# Patient Record
Sex: FEMALE | Race: WHITE | NOT HISPANIC OR LATINO | Employment: OTHER | URBAN - METROPOLITAN AREA
[De-identification: names, ages, dates, MRNs, and addresses within clinical notes are randomized per-mention and may not be internally consistent; named-entity substitution may affect disease eponyms.]

---

## 2017-12-05 ENCOUNTER — APPOINTMENT (OUTPATIENT)
Dept: PREADMISSION TESTING | Facility: HOSPITAL | Age: 62
End: 2017-12-05

## 2017-12-05 ENCOUNTER — HOSPITAL ENCOUNTER (OUTPATIENT)
Dept: GENERAL RADIOLOGY | Facility: HOSPITAL | Age: 62
Discharge: HOME | End: 2017-12-05
Admitting: ORTHOPAEDIC SURGERY

## 2017-12-05 VITALS — WEIGHT: 233.03 LBS | HEIGHT: 67 IN | BODY MASS INDEX: 36.57 KG/M2

## 2017-12-05 LAB
ABO GROUP BLD: NORMAL
ALBUMIN SERPL-MCNC: 4.2 G/DL (ref 3.2–4.8)
ALBUMIN/GLOB SERPL: 1.6 G/DL (ref 1.5–2.5)
ALP SERPL-CCNC: 77 U/L (ref 25–100)
ALT SERPL W P-5'-P-CCNC: 15 U/L (ref 7–40)
ANION GAP SERPL CALCULATED.3IONS-SCNC: 6 MMOL/L (ref 3–11)
APTT PPP: 29.9 SECONDS (ref 24–31)
AST SERPL-CCNC: 26 U/L (ref 0–33)
BACTERIA UR QL AUTO: ABNORMAL /HPF
BASOPHILS # BLD AUTO: 0.02 10*3/MM3 (ref 0–0.2)
BASOPHILS NFR BLD AUTO: 0.4 % (ref 0–1)
BILIRUB SERPL-MCNC: 0.3 MG/DL (ref 0.3–1.2)
BILIRUB UR QL STRIP: NEGATIVE
BLD GP AB SCN SERPL QL: NEGATIVE
BUN BLD-MCNC: 17 MG/DL (ref 9–23)
BUN/CREAT SERPL: 15.5 (ref 7–25)
CALCIUM SPEC-SCNC: 9.7 MG/DL (ref 8.7–10.4)
CHLORIDE SERPL-SCNC: 106 MMOL/L (ref 99–109)
CLARITY UR: ABNORMAL
CO2 SERPL-SCNC: 30 MMOL/L (ref 20–31)
COLOR UR: YELLOW
CREAT BLD-MCNC: 1.1 MG/DL (ref 0.6–1.3)
CRP SERPL-MCNC: 0.93 MG/DL (ref 0–1)
DEPRECATED RDW RBC AUTO: 48.8 FL (ref 37–54)
EOSINOPHIL # BLD AUTO: 0.15 10*3/MM3 (ref 0–0.3)
EOSINOPHIL NFR BLD AUTO: 2.6 % (ref 0–3)
ERYTHROCYTE [DISTWIDTH] IN BLOOD BY AUTOMATED COUNT: 14.1 % (ref 11.3–14.5)
ERYTHROCYTE [SEDIMENTATION RATE] IN BLOOD: 15 MM/HR (ref 0–30)
GFR SERPL CREATININE-BSD FRML MDRD: 50 ML/MIN/1.73
GLOBULIN UR ELPH-MCNC: 2.7 GM/DL
GLUCOSE BLD-MCNC: 79 MG/DL (ref 70–100)
GLUCOSE UR STRIP-MCNC: NEGATIVE MG/DL
HCT VFR BLD AUTO: 40.4 % (ref 34.5–44)
HGB BLD-MCNC: 12.9 G/DL (ref 11.5–15.5)
HGB UR QL STRIP.AUTO: NEGATIVE
HYALINE CASTS UR QL AUTO: ABNORMAL /LPF
IMM GRANULOCYTES # BLD: 0.01 10*3/MM3 (ref 0–0.03)
IMM GRANULOCYTES NFR BLD: 0.2 % (ref 0–0.6)
INR PPP: 0.98
KETONES UR QL STRIP: NEGATIVE
LEUKOCYTE ESTERASE UR QL STRIP.AUTO: NEGATIVE
LYMPHOCYTES # BLD AUTO: 1.53 10*3/MM3 (ref 0.6–4.8)
LYMPHOCYTES NFR BLD AUTO: 26.9 % (ref 24–44)
MCH RBC QN AUTO: 29.9 PG (ref 27–31)
MCHC RBC AUTO-ENTMCNC: 31.9 G/DL (ref 32–36)
MCV RBC AUTO: 93.7 FL (ref 80–99)
MONOCYTES # BLD AUTO: 0.37 10*3/MM3 (ref 0–1)
MONOCYTES NFR BLD AUTO: 6.5 % (ref 0–12)
NEUTROPHILS # BLD AUTO: 3.61 10*3/MM3 (ref 1.5–8.3)
NEUTROPHILS NFR BLD AUTO: 63.4 % (ref 41–71)
NITRITE UR QL STRIP: NEGATIVE
PH UR STRIP.AUTO: 8 [PH] (ref 5–8)
PLATELET # BLD AUTO: 322 10*3/MM3 (ref 150–450)
PMV BLD AUTO: 9.3 FL (ref 6–12)
POTASSIUM BLD-SCNC: 4 MMOL/L (ref 3.5–5.5)
PROT SERPL-MCNC: 6.9 G/DL (ref 5.7–8.2)
PROT UR QL STRIP: NEGATIVE
PROTHROMBIN TIME: 10.7 SECONDS (ref 9.6–11.5)
RBC # BLD AUTO: 4.31 10*6/MM3 (ref 3.89–5.14)
RBC # UR: ABNORMAL /HPF
REF LAB TEST METHOD: ABNORMAL
RH BLD: NEGATIVE
SODIUM BLD-SCNC: 142 MMOL/L (ref 132–146)
SP GR UR STRIP: 1.01 (ref 1–1.03)
SQUAMOUS #/AREA URNS HPF: ABNORMAL /HPF
UROBILINOGEN UR QL STRIP: ABNORMAL
WBC NRBC COR # BLD: 5.69 10*3/MM3 (ref 3.5–10.8)
WBC UR QL AUTO: ABNORMAL /HPF

## 2017-12-05 PROCEDURE — 86900 BLOOD TYPING SEROLOGIC ABO: CPT | Performed by: ORTHOPAEDIC SURGERY

## 2017-12-05 PROCEDURE — 85610 PROTHROMBIN TIME: CPT | Performed by: ORTHOPAEDIC SURGERY

## 2017-12-05 PROCEDURE — 86140 C-REACTIVE PROTEIN: CPT | Performed by: ORTHOPAEDIC SURGERY

## 2017-12-05 PROCEDURE — 85025 COMPLETE CBC W/AUTO DIFF WBC: CPT | Performed by: ORTHOPAEDIC SURGERY

## 2017-12-05 PROCEDURE — 85730 THROMBOPLASTIN TIME PARTIAL: CPT | Performed by: ORTHOPAEDIC SURGERY

## 2017-12-05 PROCEDURE — 85652 RBC SED RATE AUTOMATED: CPT | Performed by: ORTHOPAEDIC SURGERY

## 2017-12-05 PROCEDURE — 86850 RBC ANTIBODY SCREEN: CPT | Performed by: ORTHOPAEDIC SURGERY

## 2017-12-05 PROCEDURE — 93010 ELECTROCARDIOGRAM REPORT: CPT | Performed by: INTERNAL MEDICINE

## 2017-12-05 PROCEDURE — 93005 ELECTROCARDIOGRAM TRACING: CPT

## 2017-12-05 PROCEDURE — 86901 BLOOD TYPING SEROLOGIC RH(D): CPT | Performed by: ORTHOPAEDIC SURGERY

## 2017-12-05 PROCEDURE — 81001 URINALYSIS AUTO W/SCOPE: CPT | Performed by: ORTHOPAEDIC SURGERY

## 2017-12-05 PROCEDURE — 80053 COMPREHEN METABOLIC PANEL: CPT | Performed by: ORTHOPAEDIC SURGERY

## 2017-12-05 PROCEDURE — 71020 HC CHEST PA AND LATERAL: CPT

## 2017-12-05 PROCEDURE — 36415 COLL VENOUS BLD VENIPUNCTURE: CPT

## 2017-12-05 RX ORDER — ALBUTEROL SULFATE 90 UG/1
2 AEROSOL, METERED RESPIRATORY (INHALATION) EVERY 4 HOURS PRN
COMMUNITY

## 2017-12-05 RX ORDER — ASPIRIN 81 MG/1
81 TABLET ORAL DAILY
Status: ON HOLD | COMMUNITY
End: 2017-12-13

## 2017-12-05 RX ORDER — LEVOTHYROXINE SODIUM 0.1 MG/1
100 TABLET ORAL DAILY
COMMUNITY

## 2017-12-05 RX ORDER — PHENOL 1.4 %
20 AEROSOL, SPRAY (ML) MUCOUS MEMBRANE NIGHTLY
COMMUNITY

## 2017-12-05 RX ORDER — HYDROXYZINE PAMOATE 25 MG/1
25-75 CAPSULE ORAL DAILY PRN
COMMUNITY

## 2017-12-05 RX ORDER — CHLORAL HYDRATE 500 MG
1000 CAPSULE ORAL
COMMUNITY

## 2017-12-05 RX ORDER — ESCITALOPRAM OXALATE 20 MG/1
20 TABLET ORAL DAILY
COMMUNITY

## 2017-12-05 RX ORDER — LANOLIN ALCOHOL/MO/W.PET/CERES
1000 CREAM (GRAM) TOPICAL DAILY
COMMUNITY

## 2017-12-05 RX ORDER — IBUPROFEN 800 MG/1
800 TABLET ORAL EVERY 8 HOURS PRN
Status: ON HOLD | COMMUNITY
End: 2017-12-13

## 2017-12-05 RX ORDER — ALPRAZOLAM 0.5 MG/1
0.5 TABLET ORAL NIGHTLY
COMMUNITY

## 2017-12-05 RX ORDER — LEVOCETIRIZINE DIHYDROCHLORIDE 5 MG/1
5 TABLET, FILM COATED ORAL EVERY EVENING
COMMUNITY

## 2017-12-05 RX ORDER — TRAZODONE HYDROCHLORIDE 100 MG/1
300 TABLET ORAL NIGHTLY
COMMUNITY

## 2017-12-05 RX ORDER — MELATONIN
1000 DAILY
COMMUNITY

## 2017-12-05 ASSESSMENT — HOOS JR
HOOS JR SCORE: 49.858
HOOS JR SCORE: 13

## 2017-12-05 NOTE — DISCHARGE INSTRUCTIONS
The following information and instructions were given:    NPO after MN except sips of water with routine prescribed medication (except blood thinner, diabetes, or weight reducing medication) unless otherwise instructed by your physician.  Do not eat, drink, smoke or chew gum after MN the night before surgery. This also includes no mints.    DO NOT shave for two days before your procedure.  Do not wear makeup.      DO NOT wear fingernail polish (gel/regular) and/or acrylic/artificial nails on the day of surgery.   If a patient had recent manicure and would rather not remove polish or artificial nails, then the minimum requirement is that the polish/artificial nails must be removed from the middle finger on each hand.      If patient was having surgery on an upper extremity, then the patient was instructed that fingernail polish/artificial fingernails must be removed for surgery.  NO EXCEPTIONS.      If patient was having surgery on a lower extremity, then the patient was instructed that toenail polish on both extremities must be removed for surgery.  NO EXCEPTIONS.    Remove all jewelry (advised to go to jeweler if unable to remove).  Jewelry especially rings can no longer be taped for surgery.    Leave anything you consider valuable at home.    Leave your suitcase in the car until after your surgery.    Bring the following with you (if applicable)   -picture ID and insurance cards   -Co-pay/deductible required by insurance   -Medications in the original bottles (not a list) including all over-the-counter  medications if not brought to PAT   -Copy of advance directive, living will or power of  documents if not  brought to PAT   -CPAP or BIPAP mask and tubing (do not bring machine)   -Skin prep instructions sheet   -PAT Pass    Education booklet, brochure, handout or binder given to patient.    Pain Control After Surgery handout given to patient.    Respirex use (handout given to patient) and pneumonia  prevention.    Signs and Symptoms of infection.    DVT Prevention stressing the importance of ambulation.    Patient to apply Chlorhexadine wipes to surgical area (as instructed) the night before procedure and the AM of procedure.    When applicable for ERAS patients (colon, orthropedic), patients were instructed to drink 20 ounces of Gatorade or G2 for diabetics (or until full) the morning of surgery.  The Gatorade or G2 must be consumed at least 3 hours before surgery start time.  No RED Gatorade or G2.  Appropriated ERAS handout given to patient during PAT visit.

## 2017-12-05 NOTE — PAT
Heladio in surgery scheduling notified of pt latex allergy, surgery with dr grubbs on 12-11-17    Patient to apply Chlorhexadine wipes  to surgical area (as instructed) the night before procedure and the AM of procedure. Wipes provided.    Pt instructed to bring cpap mask and tubing on date of surgery     Brandy in dr grubbs office notified of pt adhesive tape and latex allergy, pt skin blistered and came off at dressing site with last hip surgery in , she stated she would let dr grubbs know

## 2017-12-11 ENCOUNTER — APPOINTMENT (OUTPATIENT)
Dept: GENERAL RADIOLOGY | Facility: HOSPITAL | Age: 62
End: 2017-12-11

## 2017-12-11 ENCOUNTER — ANESTHESIA EVENT (OUTPATIENT)
Dept: PERIOP | Facility: HOSPITAL | Age: 62
End: 2017-12-11

## 2017-12-11 ENCOUNTER — HOSPITAL ENCOUNTER (INPATIENT)
Facility: HOSPITAL | Age: 62
LOS: 2 days | Discharge: HOME | End: 2017-12-13
Attending: ORTHOPAEDIC SURGERY | Admitting: ORTHOPAEDIC SURGERY

## 2017-12-11 ENCOUNTER — ANESTHESIA (OUTPATIENT)
Dept: PERIOP | Facility: HOSPITAL | Age: 62
End: 2017-12-11

## 2017-12-11 DIAGNOSIS — Z74.09 IMPAIRED MOBILITY AND ADLS: ICD-10-CM

## 2017-12-11 DIAGNOSIS — Z78.9 IMPAIRED MOBILITY AND ADLS: ICD-10-CM

## 2017-12-11 DIAGNOSIS — Z74.09 IMPAIRED FUNCTIONAL MOBILITY, BALANCE, GAIT, AND ENDURANCE: Primary | ICD-10-CM

## 2017-12-11 PROBLEM — F41.9 ANXIETY AND DEPRESSION: Status: ACTIVE | Noted: 2017-12-11

## 2017-12-11 PROBLEM — G47.33 OSA ON CPAP: Status: ACTIVE | Noted: 2017-12-11

## 2017-12-11 PROBLEM — Z99.89 OSA ON CPAP: Status: ACTIVE | Noted: 2017-12-11

## 2017-12-11 PROBLEM — M16.12 ARTHRITIS OF LEFT HIP: Status: ACTIVE | Noted: 2017-12-11

## 2017-12-11 PROBLEM — F32.A ANXIETY AND DEPRESSION: Status: ACTIVE | Noted: 2017-12-11

## 2017-12-11 PROBLEM — Z96.642 STATUS POST TOTAL REPLACEMENT OF LEFT HIP: Status: ACTIVE | Noted: 2017-12-11

## 2017-12-11 PROBLEM — E03.9 HYPOTHYROID: Status: ACTIVE | Noted: 2017-12-11

## 2017-12-11 LAB
ABO GROUP BLD: NORMAL
BLD GP AB SCN SERPL QL: NEGATIVE
RH BLD: NEGATIVE

## 2017-12-11 PROCEDURE — C1776 JOINT DEVICE (IMPLANTABLE): HCPCS | Performed by: ORTHOPAEDIC SURGERY

## 2017-12-11 PROCEDURE — 25010000003 CEFAZOLIN IN DEXTROSE 2-4 GM/100ML-% SOLUTION: Performed by: ORTHOPAEDIC SURGERY

## 2017-12-11 PROCEDURE — 25010000002 PHENYLEPHRINE PER 1 ML: Performed by: NURSE ANESTHETIST, CERTIFIED REGISTERED

## 2017-12-11 PROCEDURE — 97110 THERAPEUTIC EXERCISES: CPT

## 2017-12-11 PROCEDURE — 25010000002 PROPOFOL 1000 MG/ML EMULSION: Performed by: NURSE ANESTHETIST, CERTIFIED REGISTERED

## 2017-12-11 PROCEDURE — 97162 PT EVAL MOD COMPLEX 30 MIN: CPT

## 2017-12-11 PROCEDURE — 0SRB04A REPLACEMENT OF LEFT HIP JOINT WITH CERAMIC ON POLYETHYLENE SYNTHETIC SUBSTITUTE, UNCEMENTED, OPEN APPROACH: ICD-10-PCS | Performed by: ORTHOPAEDIC SURGERY

## 2017-12-11 PROCEDURE — 25010000002 ROPIVACAINE PER 1 MG: Performed by: ORTHOPAEDIC SURGERY

## 2017-12-11 PROCEDURE — 86850 RBC ANTIBODY SCREEN: CPT | Performed by: ORTHOPAEDIC SURGERY

## 2017-12-11 PROCEDURE — 25010000002 ONDANSETRON PER 1 MG: Performed by: NURSE PRACTITIONER

## 2017-12-11 PROCEDURE — C1755 CATHETER, INTRASPINAL: HCPCS | Performed by: ORTHOPAEDIC SURGERY

## 2017-12-11 PROCEDURE — 73502 X-RAY EXAM HIP UNI 2-3 VIEWS: CPT

## 2017-12-11 PROCEDURE — 25010000002 HYDROMORPHONE PER 4 MG: Performed by: INTERNAL MEDICINE

## 2017-12-11 PROCEDURE — 86900 BLOOD TYPING SEROLOGIC ABO: CPT | Performed by: ORTHOPAEDIC SURGERY

## 2017-12-11 PROCEDURE — 25010000002 FENTANYL CITRATE (PF) 100 MCG/2ML SOLUTION: Performed by: NURSE ANESTHETIST, CERTIFIED REGISTERED

## 2017-12-11 PROCEDURE — 25010000002 MIDAZOLAM PER 1 MG: Performed by: NURSE ANESTHETIST, CERTIFIED REGISTERED

## 2017-12-11 PROCEDURE — 76000 FLUOROSCOPY <1 HR PHYS/QHP: CPT

## 2017-12-11 PROCEDURE — 86901 BLOOD TYPING SEROLOGIC RH(D): CPT | Performed by: ORTHOPAEDIC SURGERY

## 2017-12-11 DEVICE — PINNACLE GRIPTION ACETABULAR SHELL SECTOR 56MM OD
Type: IMPLANTABLE DEVICE | Site: HIP | Status: FUNCTIONAL
Brand: PINNACLE GRIPTION

## 2017-12-11 DEVICE — TOTL HIP STEM DEPUY UPCHRG: Type: IMPLANTABLE DEVICE | Site: HIP | Status: FUNCTIONAL

## 2017-12-11 DEVICE — TOTL HIP GRIPTION CUP DEPUY UPCHRG: Type: IMPLANTABLE DEVICE | Site: HIP | Status: FUNCTIONAL

## 2017-12-11 DEVICE — PINNACLE HIP SOLUTIONS ALTRX POLYETHYLENE ACETABULAR LINER NEUTRAL 36MM ID 56MM OD
Type: IMPLANTABLE DEVICE | Site: HIP | Status: FUNCTIONAL
Brand: PINNACLE ALTRX

## 2017-12-11 DEVICE — TOTL HIP MOA DEPUY 9641333: Type: IMPLANTABLE DEVICE | Site: HIP | Status: FUNCTIONAL

## 2017-12-11 DEVICE — CORAIL HIP SYSTEM CEMENTLESS FEMORAL STEM 12/14 AMT 135 DEGREES KA SIZE 11 HA COATED STANDARD COLLAR
Type: IMPLANTABLE DEVICE | Site: HIP | Status: FUNCTIONAL
Brand: CORAIL

## 2017-12-11 DEVICE — M-SPEC METAL FEMORAL HEAD 12/14 TAPER DIAMETER 36MM +1.5: Type: IMPLANTABLE DEVICE | Site: HIP | Status: FUNCTIONAL

## 2017-12-11 RX ORDER — FENTANYL CITRATE 50 UG/ML
50 INJECTION, SOLUTION INTRAMUSCULAR; INTRAVENOUS
Status: DISCONTINUED | OUTPATIENT
Start: 2017-12-11 | End: 2017-12-11 | Stop reason: HOSPADM

## 2017-12-11 RX ORDER — FENTANYL CITRATE 50 UG/ML
INJECTION, SOLUTION INTRAMUSCULAR; INTRAVENOUS AS NEEDED
Status: DISCONTINUED | OUTPATIENT
Start: 2017-12-11 | End: 2017-12-11 | Stop reason: SURG

## 2017-12-11 RX ORDER — MAGNESIUM HYDROXIDE 1200 MG/15ML
LIQUID ORAL AS NEEDED
Status: DISCONTINUED | OUTPATIENT
Start: 2017-12-11 | End: 2017-12-11 | Stop reason: HOSPADM

## 2017-12-11 RX ORDER — LABETALOL HYDROCHLORIDE 5 MG/ML
5 INJECTION, SOLUTION INTRAVENOUS
Status: DISCONTINUED | OUTPATIENT
Start: 2017-12-11 | End: 2017-12-11 | Stop reason: HOSPADM

## 2017-12-11 RX ORDER — HYDRALAZINE HYDROCHLORIDE 20 MG/ML
10 INJECTION INTRAMUSCULAR; INTRAVENOUS EVERY 6 HOURS PRN
Status: DISCONTINUED | OUTPATIENT
Start: 2017-12-11 | End: 2017-12-13 | Stop reason: HOSPADM

## 2017-12-11 RX ORDER — DOCUSATE SODIUM 100 MG/1
100 CAPSULE, LIQUID FILLED ORAL 2 TIMES DAILY
Status: DISCONTINUED | OUTPATIENT
Start: 2017-12-11 | End: 2017-12-13 | Stop reason: HOSPADM

## 2017-12-11 RX ORDER — CEFAZOLIN SODIUM 2 G/100ML
2 INJECTION, SOLUTION INTRAVENOUS ONCE
Status: COMPLETED | OUTPATIENT
Start: 2017-12-11 | End: 2017-12-11

## 2017-12-11 RX ORDER — OXYCODONE HYDROCHLORIDE AND ACETAMINOPHEN 5; 325 MG/1; MG/1
1 TABLET ORAL EVERY 4 HOURS PRN
Status: DISCONTINUED | OUTPATIENT
Start: 2017-12-11 | End: 2017-12-13 | Stop reason: HOSPADM

## 2017-12-11 RX ORDER — SENNA AND DOCUSATE SODIUM 50; 8.6 MG/1; MG/1
2 TABLET, FILM COATED ORAL 2 TIMES DAILY
Status: DISCONTINUED | OUTPATIENT
Start: 2017-12-11 | End: 2017-12-13 | Stop reason: HOSPADM

## 2017-12-11 RX ORDER — EPHEDRINE SULFATE 50 MG/ML
5 INJECTION, SOLUTION INTRAVENOUS ONCE AS NEEDED
Status: DISCONTINUED | OUTPATIENT
Start: 2017-12-11 | End: 2017-12-11 | Stop reason: HOSPADM

## 2017-12-11 RX ORDER — BISACODYL 10 MG
10 SUPPOSITORY, RECTAL RECTAL DAILY PRN
Status: DISCONTINUED | OUTPATIENT
Start: 2017-12-11 | End: 2017-12-13 | Stop reason: HOSPADM

## 2017-12-11 RX ORDER — HYDROMORPHONE HYDROCHLORIDE 1 MG/ML
0.5 INJECTION, SOLUTION INTRAMUSCULAR; INTRAVENOUS; SUBCUTANEOUS
Status: DISCONTINUED | OUTPATIENT
Start: 2017-12-11 | End: 2017-12-13 | Stop reason: HOSPADM

## 2017-12-11 RX ORDER — ALPRAZOLAM 0.5 MG/1
0.5 TABLET ORAL NIGHTLY PRN
Status: DISCONTINUED | OUTPATIENT
Start: 2017-12-11 | End: 2017-12-13 | Stop reason: HOSPADM

## 2017-12-11 RX ORDER — ONDANSETRON 2 MG/ML
4 INJECTION INTRAMUSCULAR; INTRAVENOUS ONCE AS NEEDED
Status: DISCONTINUED | OUTPATIENT
Start: 2017-12-11 | End: 2017-12-11 | Stop reason: HOSPADM

## 2017-12-11 RX ORDER — BISACODYL 5 MG/1
10 TABLET, DELAYED RELEASE ORAL DAILY PRN
Status: DISCONTINUED | OUTPATIENT
Start: 2017-12-11 | End: 2017-12-13 | Stop reason: HOSPADM

## 2017-12-11 RX ORDER — DIPHENHYDRAMINE HCL 25 MG
25 CAPSULE ORAL EVERY 6 HOURS PRN
Status: DISCONTINUED | OUTPATIENT
Start: 2017-12-11 | End: 2017-12-13 | Stop reason: HOSPADM

## 2017-12-11 RX ORDER — TRAMADOL HYDROCHLORIDE 50 MG/1
50 TABLET ORAL EVERY 6 HOURS PRN
Status: DISCONTINUED | OUTPATIENT
Start: 2017-12-11 | End: 2017-12-13 | Stop reason: HOSPADM

## 2017-12-11 RX ORDER — LIDOCAINE HYDROCHLORIDE 10 MG/ML
INJECTION, SOLUTION INFILTRATION; PERINEURAL AS NEEDED
Status: DISCONTINUED | OUTPATIENT
Start: 2017-12-11 | End: 2017-12-11 | Stop reason: SURG

## 2017-12-11 RX ORDER — SODIUM CHLORIDE 0.9 % (FLUSH) 0.9 %
1-10 SYRINGE (ML) INJECTION AS NEEDED
Status: DISCONTINUED | OUTPATIENT
Start: 2017-12-11 | End: 2017-12-11 | Stop reason: HOSPADM

## 2017-12-11 RX ORDER — BUPIVACAINE HYDROCHLORIDE 5 MG/ML
INJECTION, SOLUTION EPIDURAL; INTRACAUDAL AS NEEDED
Status: DISCONTINUED | OUTPATIENT
Start: 2017-12-11 | End: 2017-12-11 | Stop reason: SURG

## 2017-12-11 RX ORDER — ESCITALOPRAM OXALATE 20 MG/1
20 TABLET ORAL DAILY
Status: DISCONTINUED | OUTPATIENT
Start: 2017-12-11 | End: 2017-12-13 | Stop reason: HOSPADM

## 2017-12-11 RX ORDER — ONDANSETRON 2 MG/ML
4 INJECTION INTRAMUSCULAR; INTRAVENOUS EVERY 6 HOURS PRN
Status: DISCONTINUED | OUTPATIENT
Start: 2017-12-11 | End: 2017-12-13 | Stop reason: HOSPADM

## 2017-12-11 RX ORDER — SODIUM CHLORIDE, SODIUM LACTATE, POTASSIUM CHLORIDE, CALCIUM CHLORIDE 600; 310; 30; 20 MG/100ML; MG/100ML; MG/100ML; MG/100ML
9 INJECTION, SOLUTION INTRAVENOUS CONTINUOUS
Status: DISCONTINUED | OUTPATIENT
Start: 2017-12-11 | End: 2017-12-11

## 2017-12-11 RX ORDER — SODIUM CHLORIDE 0.9 % (FLUSH) 0.9 %
1-10 SYRINGE (ML) INJECTION AS NEEDED
Status: DISCONTINUED | OUTPATIENT
Start: 2017-12-11 | End: 2017-12-11

## 2017-12-11 RX ORDER — OXYCODONE HYDROCHLORIDE AND ACETAMINOPHEN 5; 325 MG/1; MG/1
1 TABLET ORAL ONCE AS NEEDED
Status: DISCONTINUED | OUTPATIENT
Start: 2017-12-11 | End: 2017-12-11 | Stop reason: HOSPADM

## 2017-12-11 RX ORDER — HYDROCODONE BITARTRATE AND ACETAMINOPHEN 5; 325 MG/1; MG/1
1 TABLET ORAL EVERY 4 HOURS PRN
Status: DISCONTINUED | OUTPATIENT
Start: 2017-12-11 | End: 2017-12-11

## 2017-12-11 RX ORDER — MIDAZOLAM HYDROCHLORIDE 1 MG/ML
INJECTION INTRAMUSCULAR; INTRAVENOUS AS NEEDED
Status: DISCONTINUED | OUTPATIENT
Start: 2017-12-11 | End: 2017-12-11 | Stop reason: SURG

## 2017-12-11 RX ORDER — SODIUM CHLORIDE 9 MG/ML
150 INJECTION, SOLUTION INTRAVENOUS CONTINUOUS
Status: DISCONTINUED | OUTPATIENT
Start: 2017-12-11 | End: 2017-12-13 | Stop reason: HOSPADM

## 2017-12-11 RX ORDER — ROPIVACAINE HYDROCHLORIDE 5 MG/ML
INJECTION, SOLUTION EPIDURAL; INFILTRATION; PERINEURAL AS NEEDED
Status: DISCONTINUED | OUTPATIENT
Start: 2017-12-11 | End: 2017-12-11 | Stop reason: HOSPADM

## 2017-12-11 RX ORDER — FAMOTIDINE 20 MG/1
20 TABLET, FILM COATED ORAL ONCE
Status: COMPLETED | OUTPATIENT
Start: 2017-12-11 | End: 2017-12-11

## 2017-12-11 RX ORDER — LIDOCAINE HYDROCHLORIDE 10 MG/ML
0.5 INJECTION, SOLUTION EPIDURAL; INFILTRATION; INTRACAUDAL; PERINEURAL ONCE AS NEEDED
Status: COMPLETED | OUTPATIENT
Start: 2017-12-11 | End: 2017-12-11

## 2017-12-11 RX ORDER — L.ACID,PARA/B.BIFIDUM/S.THERM 8B CELL
1 CAPSULE ORAL DAILY
Status: DISCONTINUED | OUTPATIENT
Start: 2017-12-11 | End: 2017-12-13 | Stop reason: HOSPADM

## 2017-12-11 RX ORDER — HYDROMORPHONE HYDROCHLORIDE 1 MG/ML
0.5 INJECTION, SOLUTION INTRAMUSCULAR; INTRAVENOUS; SUBCUTANEOUS
Status: DISCONTINUED | OUTPATIENT
Start: 2017-12-11 | End: 2017-12-11 | Stop reason: HOSPADM

## 2017-12-11 RX ORDER — PROCHLORPERAZINE 25 MG
25 SUPPOSITORY, RECTAL RECTAL EVERY 12 HOURS PRN
Status: DISCONTINUED | OUTPATIENT
Start: 2017-12-11 | End: 2017-12-13 | Stop reason: HOSPADM

## 2017-12-11 RX ORDER — NALOXONE HCL 0.4 MG/ML
0.4 VIAL (ML) INJECTION AS NEEDED
Status: DISCONTINUED | OUTPATIENT
Start: 2017-12-11 | End: 2017-12-11 | Stop reason: HOSPADM

## 2017-12-11 RX ORDER — TRAZODONE HYDROCHLORIDE 100 MG/1
300 TABLET ORAL NIGHTLY
Status: DISCONTINUED | OUTPATIENT
Start: 2017-12-11 | End: 2017-12-13 | Stop reason: HOSPADM

## 2017-12-11 RX ORDER — PROCHLORPERAZINE MALEATE 5 MG/1
5 TABLET ORAL EVERY 6 HOURS PRN
Status: DISCONTINUED | OUTPATIENT
Start: 2017-12-11 | End: 2017-12-13 | Stop reason: HOSPADM

## 2017-12-11 RX ORDER — LEVOTHYROXINE SODIUM 0.1 MG/1
100 TABLET ORAL DAILY
Status: DISCONTINUED | OUTPATIENT
Start: 2017-12-12 | End: 2017-12-13 | Stop reason: HOSPADM

## 2017-12-11 RX ORDER — HYDROXYZINE HYDROCHLORIDE 25 MG/1
25 TABLET, FILM COATED ORAL 3 TIMES DAILY PRN
Status: DISCONTINUED | OUTPATIENT
Start: 2017-12-11 | End: 2017-12-13 | Stop reason: HOSPADM

## 2017-12-11 RX ORDER — SODIUM CHLORIDE, SODIUM LACTATE, POTASSIUM CHLORIDE, CALCIUM CHLORIDE 600; 310; 30; 20 MG/100ML; MG/100ML; MG/100ML; MG/100ML
100 INJECTION, SOLUTION INTRAVENOUS CONTINUOUS
Status: DISCONTINUED | OUTPATIENT
Start: 2017-12-11 | End: 2017-12-11

## 2017-12-11 RX ORDER — MEPERIDINE HYDROCHLORIDE 25 MG/ML
12.5 INJECTION INTRAMUSCULAR; INTRAVENOUS; SUBCUTANEOUS
Status: DISCONTINUED | OUTPATIENT
Start: 2017-12-11 | End: 2017-12-11 | Stop reason: HOSPADM

## 2017-12-11 RX ORDER — ACETAMINOPHEN 325 MG/1
650 TABLET ORAL EVERY 4 HOURS PRN
Status: DISCONTINUED | OUTPATIENT
Start: 2017-12-11 | End: 2017-12-13 | Stop reason: HOSPADM

## 2017-12-11 RX ORDER — ASPIRIN 325 MG
325 TABLET, DELAYED RELEASE (ENTERIC COATED) ORAL DAILY
Status: DISCONTINUED | OUTPATIENT
Start: 2017-12-12 | End: 2017-12-13 | Stop reason: HOSPADM

## 2017-12-11 RX ORDER — CEFAZOLIN SODIUM 2 G/100ML
2 INJECTION, SOLUTION INTRAVENOUS EVERY 8 HOURS
Status: COMPLETED | OUTPATIENT
Start: 2017-12-11 | End: 2017-12-12

## 2017-12-11 RX ORDER — FAMOTIDINE 10 MG/ML
20 INJECTION, SOLUTION INTRAVENOUS ONCE
Status: CANCELLED | OUTPATIENT
Start: 2017-12-11 | End: 2017-12-11

## 2017-12-11 RX ADMIN — DOCUSATE SODIUM 100 MG: 100 CAPSULE, LIQUID FILLED ORAL at 17:35

## 2017-12-11 RX ADMIN — ESCITALOPRAM OXALATE 20 MG: 20 TABLET ORAL at 16:11

## 2017-12-11 RX ADMIN — PHENYLEPHRINE HYDROCHLORIDE 100 MCG: 10 INJECTION INTRAVENOUS at 11:30

## 2017-12-11 RX ADMIN — TRAMADOL HYDROCHLORIDE 50 MG: 50 TABLET, COATED ORAL at 13:25

## 2017-12-11 RX ADMIN — SODIUM CHLORIDE, POTASSIUM CHLORIDE, SODIUM LACTATE AND CALCIUM CHLORIDE: 600; 310; 30; 20 INJECTION, SOLUTION INTRAVENOUS at 11:30

## 2017-12-11 RX ADMIN — PHENYLEPHRINE HYDROCHLORIDE 100 MCG: 10 INJECTION INTRAVENOUS at 11:10

## 2017-12-11 RX ADMIN — FAMOTIDINE 20 MG: 20 TABLET, FILM COATED ORAL at 08:23

## 2017-12-11 RX ADMIN — MIDAZOLAM HYDROCHLORIDE 2 MG: 1 INJECTION, SOLUTION INTRAMUSCULAR; INTRAVENOUS at 10:04

## 2017-12-11 RX ADMIN — ALPRAZOLAM 0.5 MG: 0.5 TABLET ORAL at 21:38

## 2017-12-11 RX ADMIN — Medication 2 TABLET: at 17:35

## 2017-12-11 RX ADMIN — ONDANSETRON 4 MG: 2 INJECTION INTRAMUSCULAR; INTRAVENOUS at 16:44

## 2017-12-11 RX ADMIN — CEFAZOLIN SODIUM 2 G: 2 INJECTION, SOLUTION INTRAVENOUS at 18:12

## 2017-12-11 RX ADMIN — HYDROMORPHONE HYDROCHLORIDE 0.5 MG: 2 INJECTION INTRAMUSCULAR; INTRAVENOUS; SUBCUTANEOUS at 21:35

## 2017-12-11 RX ADMIN — BUPIVACAINE HYDROCHLORIDE 2 ML: 5 INJECTION, SOLUTION EPIDURAL; INTRACAUDAL; PERINEURAL at 10:12

## 2017-12-11 RX ADMIN — PROPOFOL 75 MCG/KG/MIN: 10 INJECTION, EMULSION INTRAVENOUS at 10:14

## 2017-12-11 RX ADMIN — OXYCODONE AND ACETAMINOPHEN 1 TABLET: 5; 325 TABLET ORAL at 23:56

## 2017-12-11 RX ADMIN — SODIUM CHLORIDE, POTASSIUM CHLORIDE, SODIUM LACTATE AND CALCIUM CHLORIDE 9 ML/HR: 600; 310; 30; 20 INJECTION, SOLUTION INTRAVENOUS at 08:23

## 2017-12-11 RX ADMIN — CEFAZOLIN SODIUM 2 G: 2 INJECTION, SOLUTION INTRAVENOUS at 10:04

## 2017-12-11 RX ADMIN — ONDANSETRON 4 MG: 2 INJECTION INTRAMUSCULAR; INTRAVENOUS at 21:36

## 2017-12-11 RX ADMIN — SODIUM CHLORIDE 150 ML/HR: 9 INJECTION, SOLUTION INTRAVENOUS at 13:27

## 2017-12-11 RX ADMIN — LIDOCAINE HYDROCHLORIDE 0.5 ML: 10 INJECTION, SOLUTION EPIDURAL; INFILTRATION; INTRACAUDAL; PERINEURAL at 08:23

## 2017-12-11 RX ADMIN — TRAZODONE HYDROCHLORIDE 300 MG: 100 TABLET ORAL at 21:35

## 2017-12-11 RX ADMIN — LIDOCAINE HYDROCHLORIDE 20 MG: 10 INJECTION, SOLUTION INFILTRATION; PERINEURAL at 10:14

## 2017-12-11 RX ADMIN — PHENYLEPHRINE HYDROCHLORIDE 200 MCG: 10 INJECTION INTRAVENOUS at 11:24

## 2017-12-11 RX ADMIN — HYDROMORPHONE HYDROCHLORIDE 0.5 MG: 2 INJECTION INTRAMUSCULAR; INTRAVENOUS; SUBCUTANEOUS at 18:11

## 2017-12-11 RX ADMIN — Medication 1 CAPSULE: at 16:11

## 2017-12-11 RX ADMIN — PHENYLEPHRINE HYDROCHLORIDE 100 MCG: 10 INJECTION INTRAVENOUS at 11:02

## 2017-12-11 RX ADMIN — TRANEXAMIC ACID 1000 MG: 100 INJECTION, SOLUTION INTRAVENOUS at 11:25

## 2017-12-11 RX ADMIN — HYDROCODONE BITARTRATE AND ACETAMINOPHEN 1 TABLET: 5; 325 TABLET ORAL at 16:11

## 2017-12-11 RX ADMIN — FENTANYL CITRATE 100 MCG: 50 INJECTION, SOLUTION INTRAMUSCULAR; INTRAVENOUS at 10:04

## 2017-12-11 RX ADMIN — SODIUM CHLORIDE 150 ML/HR: 9 INJECTION, SOLUTION INTRAVENOUS at 23:56

## 2017-12-11 RX ADMIN — TRANEXAMIC ACID 1000 MG: 100 INJECTION, SOLUTION INTRAVENOUS at 10:15

## 2017-12-11 NOTE — PLAN OF CARE
Problem: Patient Care Overview (Adult)  Goal: Plan of Care Review  Outcome: Ongoing (interventions implemented as appropriate)    12/11/17 1535   Coping/Psychosocial Response Interventions   Plan Of Care Reviewed With patient   Patient Care Overview   Progress progress toward functional goals is gradual   Outcome Evaluation   Outcome Summary/Follow up Plan Patient with great motor function with ther ex but still with some numbness/tingling in LEs from spinal and therefore with knee buckling with weight bearing. Patient able to compensate with UE weight bearing and take step towards HOB. Encouraged further ambulation with nursing later when numbness/tingling fully resolves. Plan is d/c home with HHPT. Will continue to progress as able.          Problem: Inpatient Physical Therapy  Goal: Bed Mobility Goal LTG- PT  Outcome: Ongoing (interventions implemented as appropriate)    12/11/17 1535   Bed Mobility PT LTG   Bed Mobility PT LTG, Date Established 12/11/17   Bed Mobility PT LTG, Time to Achieve 3 days   Bed Mobility PT LTG, Activity Type supine to sit/sit to supine   Bed Mobility PT LTG, [place] Level conditional independence   Bed Mobility PT LTG, Date Goal Reviewed 12/11/17   Bed Mobility PT LTG, Outcome goal ongoing       Goal: Transfer Training Goal 1 LTG- PT  Outcome: Ongoing (interventions implemented as appropriate)    12/11/17 1535   Transfer Training PT LTG   Transfer Training PT LTG, Date Established 12/11/17   Transfer Training PT LTG, Time to Achieve 3 days   Transfer Training PT LTG, Activity Type sit to stand/stand to sit   Transfer Training PT LTG, [place] Level conditional independence   Transfer Training PT LTG, Assist Device walker, rolling   Transfer Training PT LTG, Date Goal Reviewed 12/11/17   Transfer Training PT LTG, Outcome goal ongoing       Goal: Gait Training Goal LTG- PT  Outcome: Ongoing (interventions implemented as appropriate)    12/11/17 1535   Gait Training PT LTG   Gait  Training Goal PT LTG, Date Established 12/11/17   Gait Training Goal PT LTG, Time to Achieve 3 days   Gait Training Goal PT LTG, [place] Level conditional independence   Gait Training Goal PT LTG, Assist Device walker, rolling   Gait Training Goal PT LTG, Distance to Achieve 500 feet   Gait Training Goal PT LTG, Date Goal Reviewed 12/11/17   Gait Training Goal PT LTG, Outcome goal ongoing       Goal: Stair Training Goal LTG- PT  Outcome: Ongoing (interventions implemented as appropriate)    12/11/17 1535   Stair Training PT LTG   Stair Training Goal PT LTG, Date Established 12/11/17   Stair Training Goal PT LTG, Time to Achieve 3 days   Stair Training Goal PT LTG, Number of Steps 1   Stair Training Goal PT LTG, [place] Level contact guard assist   Stair Training Goal PT LTG, Assist Device walker, rolling;other (see comments)  (backwards)   Stair Training Goal PT LTG, Date Goal Reviewed 12/11/17   Stair Training Goal PT LTG, Outcome goal ongoing

## 2017-12-11 NOTE — H&P
Patient Name: Dena Guillermo  MRN: [id number]  : 1955  DOS: 2017    Attending: Everett Ellis MD    Primary Care Provider: Elisa Landry MD      Chief complaint:  Left hip pain    Subjective   Patient is a 62 y.o. female presented for left total hip arthroplasty by Dr. Ellis under spinal anesthesia. She tolerated surgery well and is admitted for further medical management. Her hip has been painful for about 8 months. She denies use of assistive device for ambulation.     She had right hip replacement in Dec 2016 and recovered well.     When seen postop she is doing well. Her pain is well controlled. She denies nausea, shortness of breath or chest pain. No hx of DVT or PE.    (Above is noted, agree.  Seen later in her room, she complains of severe pain.  She has already attempted to ambulate with physical therapy but residual effect of spinal anesthesia including numbness and tingling caused her to have knee buckling with weightbearing.  I discussed with patient medication changes to help with pain control.  No shortness breath, no nausea or vomiting.)wy        Allergies:  Allergies   Allergen Reactions   • Adhesive Tape Other (See Comments)     Blisters    • Latex      Added based on information entered during log entry, please review and add reactions, type, and severity as needed. PATIENT DENIES LATEX ALLERGY, STATES SHE IS NOT ALLERGIC TO LATEX   • Codeine Itching   • Meloxicam Itching   • Relafen [Nabumetone] Itching       Meds:  Prescriptions Prior to Admission   Medication Sig Dispense Refill Last Dose   • ALPRAZolam (XANAX) 0.5 MG tablet Take 0.5 mg by mouth Every Night.   12/10/2017   • cholecalciferol (VITAMIN D3) 1000 units tablet Take 1,000 Units by mouth Daily   12/10/2017   • CINNAMON PO Take 1,000 mg by mouth Daily   12/10/2017   • escitalopram (LEXAPRO) 20 MG tablet Take 20 mg by mouth Daily   12/10/2017   • hydrOXYzine (VISTARIL) 25 MG capsule Take 25-75 mg by mouth Daily As  Needed for Anxiety   12/10/2017   • ibuprofen (ADVIL,MOTRIN) 800 MG tablet Take 800 mg by mouth Every 8 (Eight) Hours As Needed for Mild Pain   Past Month   • levocetirizine (XYZAL) 5 MG tablet Take 5 mg by mouth Every Evening   12/10/2017   • levothyroxine (SYNTHROID, LEVOTHROID) 100 MCG tablet Take 100 mcg by mouth Daily   12/10/2017   • MAGNESIUM PO Take 1 tablet by mouth Daily   Past Week   • Melatonin 10 MG tablet Take 20 mg by mouth Every Night   12/10/2017   • Multiple Vitamins-Minerals (MULTIVITAMIN ADULT PO) Take 1 tablet by mouth Daily   12/10/2017   • Omega-3 Fatty Acids (FISH OIL) 1000 MG capsule capsule Take 1,000 mg by mouth Daily With Breakfast   12/10/2017   • Probiotic Product (PROBIOTIC ADVANCED PO) Take 1 capsule by mouth Daily   12/10/2017   • traZODone (DESYREL) 100 MG tablet Take 300 mg by mouth Every Night   12/10/2017   • vitamin B-12 (CYANOCOBALAMIN) 1000 MCG tablet Take 1,000 mcg by mouth Daily   12/10/2017   • VITAMIN E PO Take 1 capsule by mouth Daily   Past Week   • albuterol (PROVENTIL HFA;VENTOLIN HFA) 108 (90 Base) MCG/ACT inhaler Inhale 2 puffs Every 4 (Four) Hours As Needed for Wheezing or Shortness of Air   More than a month   • aspirin 81 MG EC tablet Take 81 mg by mouth Daily   12/2/2017       History:   Past Medical History:   Diagnosis Date   • Anxiety    • Arthritis    • Hypothyroid    • Joint pain    • CHAYITO on CPAP    • Pericarditis 2014   • Seasonal allergies    • Wears glasses      Past Surgical History:   Procedure Laterality Date   • BLADDER SURGERY      with mesh    • CHOLECYSTECTOMY  1974   • COLONOSCOPY  2015   • HIP ARTH[place]OPLASTY Right 12/2016    [place] in [place]    • HYSTERECTOMY  2004   • WRIST SURGERY Right 2009     History reviewed. No pertinent family history.  Social History   Substance Use Topics   • Smoking status: Never Smoker   • Smokeless tobacco: Never Used   • Alcohol use Yes      Comment: occasional 1x/year    She is  and has 1 living child and  "1 . She is retired from the state.    Review of Systems  Pertinent items are noted in HPI, all other systems reviewed and negative    Vital Signs  /71  Pulse 65  Temp 97.8 °F (36.6 °C) (Oral)   Resp 16  Ht 170.2 cm (67\")  Wt 106 kg (233 lb 0.4 oz)  SpO2 100%  BMI 36.5 kg/m2    Physical Exam:    General Appearance:    Alert, cooperative, in no acute distress   Head:    Normocephalic, without obvious abnormality, atraumatic   Eyes:            Lids and lashes normal, conjunctivae and sclerae normal, no   icterus, no pallor, corneas clear, PERRLA   Ears:    Ears appear intact with no abnormalities noted   Neck:   No adenopathy, supple, trachea midline, no thyromegaly, no     carotid bruit, no JVD   Lungs:     Clear to auscultation,respirations regular, even and                   unlabored    Heart:    Regular rhythm and normal rate, normal S1 and S2, no            murmur, no gallop   Abdomen:     Normal bowel sounds, no masses, no organomegaly, soft        non-tender, non-distended, no guarding, no rebound                 tenderness   Genitalia:    Deferred   Extremities:   Right hip Prineo CDI.    Pulses:   Pulses palpable and equal bilaterally   Skin:   No bleeding, bruising or rash   Neurologic:   Cranial nerves 2 - 12 grossly intact, sensation intact. Flexion and dorsiflexion intact bilateral feet.        I reviewed the patient's new clinical results.         Results from last 7 days  Lab Units 17  1338   WBC 10*3/mm3 5.69   HEMOGLOBIN g/dL 12.9   HEMATOCRIT % 40.4   PLATELETS 10*3/mm3 322       Results from last 7 days  Lab Units 17  1338   SODIUM mmol/L 142   POTASSIUM mmol/L 4.0   CHLORIDE mmol/L 106   CO2 mmol/L 30.0   BUN mg/dL 17   CREATININE mg/dL 1.10   CALCIUM mg/dL 9.7   BILIRUBIN mg/dL 0.3   ALK PHOS U/L 77   ALT (SGPT) U/L 15   AST (SGOT) U/L 26   GLUCOSE mg/dL 79     Assessment and Plan:   Principal Problem:    Status post total replacement of left hip  Active " Problems:    Arthritis of left hip    CHAYITO on CPAP    Hypothyroid    Anxiety and depression    Obesity    Plan  1. PT/OT- early ambulation post op  2. Pain control-prns( will try percocet/Dilaudid  3. IS-encourage  4. DVT proph- mechs/ASA  5. Bowel regimen  6. Resume home medications as appropriate  7. Monitor post-op labs  8. DC planning for home    CHAYITO  - CPAP at night  - monitor O2 sats    Hypothyroid  - continue home Synthroid    Anxiety and depression  - continue home Xanax, vistaril, trazadone and lexapro    Seen and examined by me. Agree with above. Discussed with patient. dominick Singh MD  12/11/17  6:27 PM

## 2017-12-11 NOTE — THERAPY EVALUATION
Acute Care - Physical Therapy Initial Evaluation  [facility]     Patient Name: Dena Guillermo  : 1955  MRN: [id number]  Today's Date: 2017   Onset of Illness/Injury or Date of Surgery Date: 17  Date of Referral to PT: 17  Referring Physician: MD Caleb      Admit Date: 2017     Visit Dx:    ICD-10-CM ICD-9-CM   1. Impaired functional mobility, balance, gait, and endurance Z74.09 V49.89     Patient Active Problem List   Diagnosis   • Arthritis of left hip   • Status post total replacement of left hip   • CHAYITO on CPAP   • Hypothyroid   • Anxiety and depression     Past Medical History:   Diagnosis Date   • Anxiety    • Arthritis    • Disease of thyroid gland    • Joint pain    • CHAYITO on CPAP    • Pericarditis    • Seasonal allergies    • Wears glasses      Past Surgical History:   Procedure Laterality Date   • BLADDER SURGERY      with mesh    • CHOLECYSTECTOMY     • COLONOSCOPY     • HIP ARTHROPLASTY Right 2016    [place] in [place]    • HYSTERECTOMY     • WRIST SURGERY Right           PT ASSESSMENT (last 72 hours)      PT Evaluation       17 1535 17 1300    Rehab Evaluation    Document Type evaluation  -LR     Subjective Information numbness;agree to therapy;complains of;pain   reports mild numbness from spinal still lingering  -LR     Patient Effort, Rehab Treatment good  -LR     Symptoms Noted During/After Treatment none  -LR     General Information    Patient Profile Review yes  -LR     Onset of Illness/Injury or Date of Surgery Date 17  -LR     Referring Physician MD Caleb  -LR     General Observations Patient supine in bed upon arrival. IV, SCDs.   -LR     Pertinent History Of Current Problem Patient presents for surgical management of persistent and progressive L hip pain and dysfunction that has failed to improve with conservative management. Pt underwent R ADENIKE via posterior approach 2016.   -LR     Precautions/Limitations fall  precautions;anterior hip precautions- left  -LR     Prior Level of Function min assist:;all household mobility;community mobility;gait;transfer;bed mobility  -LR     Equipment Currently Used at Home grab bar;shower chair;raised toilet;walker, rolling;cane, straight;cane, quad   built in shower seats, not currently using AD  -LR none  -MK    Plans/Goals Discussed With patient;agreed upon  -LR     Risks Reviewed patient:;LOB;nausea/vomiting;dizziness;increased discomfort;lines disloged  -LR     Benefits Reviewed patient:;improve function;increase independence;increase strength;increase balance;decrease pain;increase knowledge  -LR     Barriers to Rehab previous functional deficit  -LR     Living Environment    Lives With spouse  -LR     Living Arrangements house  -LR     Home Accessibility bed and bath on same level;house is not wheelchair accessible;grab bars present (bathtub);grab bars present (toilet);stairs to enter home;other (see comments)   walk in shower  -LR     Number of Stairs to Enter Home 1  -LR     Stair Railings at Home none  -LR     Type of Financial/Environmental Concern none  -LR     Transportation Available family or friend will provide  -LR     Living Environment Comment Patient reports her  is available to assist at all times upon d/c home.   -LR     Clinical Impression    Date of Referral to PT 12/11/17  -LR     PT Diagnosis s/p elective L ADENIKE via anterior approach  -LR     Prognosis good  -LR     Patient/Family Goals Statement go home, decrease pain  -LR     Criteria for Skilled Therapeutic Interventions Met yes;treatment indicated  -LR     Rehab Potential good, to achieve stated therapy goals  -LR     Pain Assessment    Pain Assessment 0-10  -LR     Pain Score 8  -LR     Post Pain Score 8  -LR     Pain Type Acute pain  -LR     Pain Location Hip  -LR     Pain Orientation Left;Anterior  -LR     Pain Descriptors Burning  -LR     Pain Intervention(s) Repositioned;Ambulation/increased  activity  -LR     Vision Assessment/Intervention    Visual Impairment WFL  -LR     Cognitive Assessment/Intervention    Current Cognitive/Communication Assessment functional  -LR     Orientation Status oriented x 4;required verbal cueing (specifiy in comments)  -LR     Follows Commands/Answers Questions 100% of the time;able to follow single-step instructions;needs cueing;needs repetition  -LR     Personal Safety WNL/WFL  -LR     ROM (Range of Motion)    General ROM lower extremity range of motion deficits identified  -LR     General LE Assessment    ROM RLE ROM was WFL;hip, left: LE ROM deficit  -LR     ROM Detail L hip AROM impaired 25%  -LR     MMT (Manual Muscle Testing)    General MMT Assessment lower extremity strength deficits identified  -LR     Lower Extremity    Lower Ext Manual Muscle Testing right LE strength is WFL;left hip strength deficit  -LR     Lower Ext Manual Muscle Testing Detail L LE functionally 3-/5, buckling from effects of spinal with weight bearing  -LR     Mobility Assessment/Training    Extremity Weight-Bearing Status left lower extremity  -LR     Left Lower Extremity Weight-Bearing weight-bearing as tolerated  -LR     Bed Mobility, Assessment/Treatment    Bed Mobility, Assistive Device head of bed elevated;bed rails  -LR     Bed Mob, Supine to Sit, [place] verbal cues required;contact guard assist  -LR     Bed Mob, Sit to Supine, [place] verbal cues required;contact guard assist  -LR     Bed Mobility, Safety Issues decreased use of legs for bridging/pushing  -LR     Bed Mobility, Impairments ROM decreased;strength decreased;pain  -LR     Bed Mobility, Comment Verbal cues to move LEs towards EOB and to push up from bed from behind to raise trunk into sitting and to scoot hips out to get feet on floor. Denied dizziness upon sitting up. CGA to L LE to assist back into bed.   -LR     Transfer Assessment/Treatment    Transfers, Sit-Stand [place] verbal cues required;contact  guard assist;2 person assist required  -LR     Transfers, Stand-Sit [place] verbal cues required;minimum assist (75% patient effort);2 person assist required  -LR     Transfers, Sit-Stand-Sit, Assist Device rolling walker  -LR     Transfer, Safety Issues sequencing ability decreased;step length decreased;weight-shifting ability decreased;knees buckling  -LR     Transfer, Impairments ROM decreased;strength decreased;pain  -LR     Transfer, Comment Verbal cues to push up from bed to stand instead of pulling up on RW to stand. Verbal cues to reach back for bed to lower into sitting. Patient with B knee buckling with weight shifting, d/t numbness remaining from spinal. Stood again from bed to allow for bed to be changed and to take steps towards HOB.   -LR     Gait Assessment/Treatment    Gait, [place] Level verbal cues required;contact guard assist;2 person assist required  -LR     Gait, Assistive Device rolling walker  -LR     Gait, Distance (Feet) 3  -LR     Gait, Gait Pattern Analysis swing-to gait  -LR     Gait, Gait Deviations bilateral:;knee buckling;step length decreased;toe-to-floor clearance decreased;weight-shifting ability decreased  -LR     Gait, Safety Issues sequencing ability decreased;step length decreased;weight-shifting ability decreased  -LR     Gait, Impairments ROM decreased;strength decreased;pain  -LR     Gait, Comment Unable to take steps away from bed d/t bilateral knee buckling d/t effects of spinal, but able to take side steps towards HOB with cues for increased UE weight bearing on RW to compensate for knee buckling.   -LR     Therapy Exercises    Exercise Protocols total hip   anterior  -LR     Total Hip Exercises left:;10 reps;ankle pumps/circles;quad set;glut set   cues for technique; independent with LAQ  -LR     Sensory Assessment/Intervention    Sensory Impairment --   c/o mild numbness/tingling in LEs from spinal;actively DF B  -LR     Positioning and Restraints     Pre-Treatment Position in bed  -LR     Post Treatment Position bed  -LR     In Bed notified nsg;supine;call light within reach;encouraged to call for assist;with nsg;side rails up x2  -       12/11/17 0828       Living Environment    Lives With spouse  -MY     Living Arrangements house  -MY     Home Accessibility no concerns  -MY     Type of Financial/Environmental Concern none  -MY     Transportation Available car;family or friend will provide  -MY       User Key  (r) = Recorded By, (t) = Taken By, (c) = Cosigned By    Initials Name Provider Type    LR Sally Barahona, PT Physical Therapist    MY Beulah Kendrick, RN Registered Nurse    CADY Amezquita, RN Registered Nurse          Physical Therapy Education     Title: PT OT SLP Therapies (Done)     Topic: Physical Therapy (Done)     Point: Mobility training (Done)    Learning Progress Summary    Learner Readiness Method Response Comment Documented by Status   Patient Acceptance D,E VU,NR Educated on HEP, hip precautions, and weight bearing status. LR 12/11/17 1625 Done               Point: Home exercise program (Done)    Learning Progress Summary    Learner Readiness Method Response Comment Documented by Status   Patient Acceptance D,E VU,NR Educated on HEP, hip precautions, and weight bearing status. LR 12/11/17 1625 Done               Point: Body mechanics (Done)    Learning Progress Summary    Learner Readiness Method Response Comment Documented by Status   Patient Acceptance D,E VU,NR Educated on HEP, hip precautions, and weight bearing status. LR 12/11/17 1625 Done               Point: Precautions (Done)    Learning Progress Summary    Learner Readiness Method Response Comment Documented by Status   Patient Acceptance D,E VU,NR Educated on HEP, hip precautions, and weight bearing status.  12/11/17 1625 Done                      User Key     Initials Effective Dates Name Provider Type Discipline    LR 06/19/15 -  Sally Barahona, PT  Physical Therapist PT                PT Recommendation and Plan  Anticipated Equipment Needs At Discharge:  (none)  Anticipated Discharge Disposition: home with assist, home with home health  Planned Therapy Interventions: balance training, bed mobility training, gait training, home exercise program, patient/family education, ROM (Range of Motion), stair training, strengthening, transfer training  PT Frequency: 2 times/day  Plan of Care Review  Plan Of Care Reviewed With: patient  Progress: progress toward functional goals is gradual  Outcome Summary/Follow up Plan: Patient with great motor function with ther ex but still with some numbness/tingling in LEs from spinal and therefore with knee buckling with weight bearing. Patient able to compensate with UE weight bearing and take step towards HOB. Encouraged further ambulation with nursing later when numbness/tingling fully resolves. Plan is d/c home with HHPT. Will continue to progress as able.           IP PT Goals       12/11/17 1535          Bed Mobility PT LTG    Bed Mobility PT LTG, Date Established 12/11/17  -LR      Bed Mobility PT LTG, Time to Achieve 3 days  -LR      Bed Mobility PT LTG, Activity Type supine to sit/sit to supine  -LR      Bed Mobility PT LTG, [place] Level conditional independence  -LR      Bed Mobility PT LTG, Date Goal Reviewed 12/11/17  -LR      Bed Mobility PT LTG, Outcome goal ongoing  -LR      Transfer Training PT LTG    Transfer Training PT LTG, Date Established 12/11/17  -LR      Transfer Training PT LTG, Time to Achieve 3 days  -LR      Transfer Training PT LTG, Activity Type sit to stand/stand to sit  -LR      Transfer Training PT LTG, [place] Level conditional independence  -LR      Transfer Training PT LTG, Assist Device walker, rolling  -LR      Transfer Training PT  LTG, Date Goal Reviewed 12/11/17  -LR      Transfer Training PT LTG, Outcome goal ongoing  -LR      Gait Training PT LTG    Gait Training Goal PT LTG,  Date Established 12/11/17  -LR      Gait Training Goal PT LTG, Time to Achieve 3 days  -LR      Gait Training Goal PT LTG, [place] Level conditional independence  -LR      Gait Training Goal PT LTG, Assist Device walker, rolling  -LR      Gait Training Goal PT LTG, Distance to Achieve 500 feet  -LR      Gait Training Goal PT LTG, Date Goal Reviewed 12/11/17  -LR      Gait Training Goal PT LTG, Outcome goal ongoing  -LR      Stair Training PT LTG    Stair Training Goal PT LTG, Date Established 12/11/17  -LR      Stair Training Goal PT LTG, Time to Achieve 3 days  -LR      Stair Training Goal PT LTG, Number of Steps 1  -LR      Stair Training Goal PT LTG, [place] Level contact guard assist  -LR      Stair Training Goal PT LTG, Assist Device walker, rolling;other (see comments)   backwards  -LR      Stair Training Goal PT LTG, Date Goal Reviewed 12/11/17  -LR      Stair Training Goal PT LTG, Outcome goal ongoing  -LR        User Key  (r) = Recorded By, (t) = Taken By, (c) = Cosigned By    Initials Name Provider Type    LR Sally Barahona, PT Physical Therapist                Outcome Measures       12/11/17 1535          How much help from another person do you currently need...    Turning from your back to your side while in flat bed without using bedrails? 3  -LR      Moving from lying on back to sitting on the side of a flat bed without bedrails? 3  -LR      Moving to and from a bed to a chair (including a wheelchair)? 1  -LR      Standing up from a chair using your arms (e.g., wheelchair, bedside chair)? 3  -LR      Climbing 3-5 steps with a railing? 1  -LR      To walk in hospital room? 1  -LR      AM-PAC 6 Clicks Score 12  -LR      Functional Assessment    Outcome Measure Options AM-PAC 6 Clicks Basic Mobility (PT)  -LR        User Key  (r) = Recorded By, (t) = Taken By, (c) = Cosigned By    Initials Name Provider Type    CHEYENNE Barahona, PT Physical Therapist           Time Calculation:          PT Charges       12/11/17 1626          Time Calculation    Start Time 1535  -LR      PT Received On 12/11/17  -LR      PT Goal Re-Cert Due Date 12/21/17  -LR      Time Calculation- PT    Total Timed Code Minutes- PT 10 minute(s)  -LR        User Key  (r) = Recorded By, (t) = Taken By, (c) = Cosigned By    Initials Name Provider Type    LR Sally Barahona, PT Physical Therapist          Therapy Charges for Today     Code Description Service Date Service Provider Modifiers Qty    28217396735 HC PT THER PROC EA 15 MIN 12/11/2017 Sally Barahona, PT GP 1    85674670871 HC PT THER SUPP EA 15 MIN 12/11/2017 Sally Barahona, PT GP 2    25954289088 HC PT EVAL MOD COMPLEXITY 3 12/11/2017 Sally Barahona, PT GP 1          PT G-Codes  Outcome Measure Options: AM-PAC 6 Clicks Basic Mobility (PT)      Sally Barahona, PT  12/11/2017

## 2017-12-11 NOTE — ANESTHESIA POSTPROCEDURE EVALUATION
Patient: Dena Guillermo    Procedure Summary     Date Anesthesia Start Anesthesia Stop Room / Location    12/11/17 1004  [facility]       Procedure Diagnosis Surgeon Provider    TOTAL HIP ARTHROPLASTY ANTERIOR LEFT (Left Hip) No diagnosis on file. MD Adelfo Montano MD          Anesthesia Type: spinal  Last vitals  BP   98/54   Temp   98.1   Pulse   68   Resp   18     SpO2   99     Post Anesthesia Care and Evaluation    Patient location during evaluation: PACU  Patient participation: complete - patient participated  Level of consciousness: awake and alert  Pain score: 0  Pain management: adequate  Airway patency: patent  Anesthetic complications: No anesthetic complications  PONV Status: none  Cardiovascular status: hemodynamically stable and acceptable  Respiratory status: nonlabored ventilation, acceptable and nasal cannula  Hydration status: acceptable

## 2017-12-11 NOTE — H&P
Pre-Op H&P  Dena Guillermo  [id number]  1955    Chief complaint: Left hip pain    HPI:    Patient is a 62 y.o.female presents with left hip pain and found to have OA of left hip and here today for left total anterior hip arthroplasty.       Review of Systems:  General ROS: negative for chills, fever or skin lesions;  No changes since last office visit  Cardiovascular ROS: no chest pain or dyspnea on exertion  Respiratory ROS: no cough, shortness of breath, or wheezing    Allergies:   Allergies   Allergen Reactions   • Adhesive Tape Other (See Comments)     Blisters    • Codeine Itching   • Meloxicam Itching   • Relafen [Nabumetone] Itching       Home Meds:    Prescriptions Prior to Admission   Medication Sig Dispense Refill Last Dose   • ALPRAZolam (XANAX) 0.5 MG tablet Take 0.5 mg by mouth Every Night.   12/10/2017   • cholecalciferol (VITAMIN D3) 1000 units tablet Take 1,000 Units by mouth Daily   12/10/2017   • CINNAMON PO Take 1,000 mg by mouth Daily   12/10/2017   • escitalopram (LEXAPRO) 20 MG tablet Take 20 mg by mouth Daily   12/10/2017   • hydrOXYzine (VISTARIL) 25 MG capsule Take 25-75 mg by mouth Daily As Needed for Anxiety   12/10/2017   • ibuprofen (ADVIL,MOTRIN) 800 MG tablet Take 800 mg by mouth Every 8 (Eight) Hours As Needed for Mild Pain   Past Month   • levocetirizine (XYZAL) 5 MG tablet Take 5 mg by mouth Every Evening   12/10/2017   • levothyroxine (SYNTHROID, LEVOTHROID) 100 MCG tablet Take 100 mcg by mouth Daily   12/10/2017   • MAGNESIUM PO Take 1 tablet by mouth Daily   Past Week   • Melatonin 10 MG tablet Take 20 mg by mouth Every Night   12/10/2017   • Multiple Vitamins-Minerals (MULTIVITAMIN ADULT PO) Take 1 tablet by mouth Daily   12/10/2017   • Omega-3 Fatty Acids (FISH OIL) 1000 MG capsule capsule Take 1,000 mg by mouth Daily With Breakfast   12/10/2017   • Probiotic Product (PROBIOTIC ADVANCED PO) Take 1 capsule by mouth Daily   12/10/2017   • traZODone (DESYREL) 100 MG tablet  "Take 300 mg by mouth Every Night   12/10/2017   • vitamin B-12 (CYANOCOBALAMIN) 1000 MCG tablet Take 1,000 mcg by mouth Daily   12/10/2017   • VITAMIN E PO Take 1 capsule by mouth Daily   Past Week   • albuterol (PROVENTIL HFA;VENTOLIN HFA) 108 (90 Base) MCG/ACT inhaler Inhale 2 puffs Every 4 (Four) Hours As Needed for Wheezing or Shortness of Air   More than a month   • aspirin 81 MG EC tablet Take 81 mg by mouth Daily   12/2/2017       PMH:   Past Medical History:   Diagnosis Date   • Anxiety    • Arthritis    • Disease of thyroid gland    • Joint pain    • CHAYITO on CPAP    • Pericarditis 2014   • Seasonal allergies    • Wears glasses      PSH:    Past Surgical History:   Procedure Laterality Date   • BLADDER SURGERY      with mesh    • CHOLECYSTECTOMY  1974   • COLONOSCOPY  2015   • HIP ARTHROPLASTY Right 12/2016    [facility] in [place]    • HYSTERECTOMY  2004   • WRIST SURGERY Right 2009       Immunization History:  Influenza: yes 2017  Pneumococcal: yes < 5 years  Tetanus: unknown    Social History:   Tobacco:   History   Smoking Status   • Never Smoker   Smokeless Tobacco   • Never Used      Alcohol:     History   Alcohol Use   • Yes     Comment: occasional 1x/year        Vitals:           /74 (BP Location: Right arm, Patient Position: Lying)  Pulse 67  Temp 97.2 °F (36.2 °C) (Temporal Artery )   Resp 18  Ht 170.2 cm (67\")  Wt 106 kg (233 lb 0.4 oz)  SpO2 97%  BMI 36.5 kg/m2    Physical Exam:  General Appearance:    Alert, cooperative, no distress, appears stated age   Head:    Normocephalic, without obvious abnormality, atraumatic   Lungs:     Clear to auscultation bilaterally, respirations unlabored    Heart:   Regular rate and rhythm, S1 and S2 normal, no murmur, rub    or gallop    Abdomen:    Soft, non-tender.  +bowel sounds   Breast Exam:    deferred   Genitalia:    deferred   Extremities:   Extremities normal, atraumatic, no cyanosis or edema   Skin:   Skin color, texture, turgor normal, no " rashes or lesions   Neurologic:   Grossly intact   Results Review  I reviewed the patient's new clinical results.    Cancer Staging (if applicable)  Cancer Patient: __ yes _x_no __unknown; If yes, clinical stage T:__ N:__M:__, stage group or __N/A    Impression: Primary OA left hip    Plan: Left anterior total hip arthroplasty    Hui Villatoro, PHI   12/11/2017   8:31 AM

## 2017-12-11 NOTE — PLAN OF CARE
Problem: Fall Risk (Adult)  Goal: Identify Related Risk Factors and Signs and Symptoms  Outcome: Outcome(s) achieved Date Met:  12/11/17 12/11/17 1518   Fall Risk   Fall Risk: Related Risk Factors age-related changes;history of falls;objects hard to reach;gait/mobility problems   Fall Risk: Signs and Symptoms presence of risk factors       Goal: Absence of Falls  Outcome: Ongoing (interventions implemented as appropriate)    12/11/17 6766   Fall Risk (Adult)   Absence of Falls making progress toward outcome

## 2017-12-11 NOTE — OP NOTE
TOTAL HIP ARTHROPLASTY ANTERIOR  Progress Note    Dena Guillermo  12/11/2017    Pre-op Diagnosis:   Left hip OA       Post-Op Diagnosis Codes:  Left hip OA    Procedure/CPT® Codes:  72123    Procedure(s):  TOTAL HIP ARTHROPLASTY ANTERIOR LEFT    Surgeon(s):  Everett Ellis MD    Anesthesia: General with Block    Staff:   Circulator: Yessica Loredo RN  Radiology Technologist: Nicolsaa Carroll  Scrub Person: Adonay Paul; Aby Rizo  Vendor Representative: Matt Hay  Nursing Assistant: Guy Murdock  Assistant: La Mejia CSA    Estimated Blood Loss: 250 mL    Urine Voided: * No values recorded between 12/11/2017 10:00 AM and 12/11/2017 11:37 AM *    Specimens:                None      Drains:           Findings: Expected    Complications: None    Implants: Depuy [place] 56 acetabular cup; no additional fixation screws; N/N poly liner                        [place] press-fit stem size 11 KA with +1.5/36 neck/head adapter      Indications:  62-year-old woman with end-stage left hip osteoarthritis symptoms. X-rays show near complete loss superior medial joint space. Pain is limiting routine daily activities. She had successful right ADENIKE distantly. Risks benefits indications and rationale for total hip arthroplasty discussed at length with patient. She signs her own consent after all questions are answered.      Procedure:  While in the OR spinal anesthetic achieved with satisfactory level. Turned to the supine position and prepped and draped in standard fashion for anterior approach to the right hip on the [place] table. Fluoroscopy used to assess limb lengths. These were restored with final component selection and position. Incision made over the tensor fascia diana and routine dissection carried down to raise the fascia over the medial margin of the tensor muscle belly. Circumflex vessels were identified and cauterized. Bleeding reasonably controlled with estimated total blood loss 250 mL. Femoral  neck isolated. T-shaped capsulotomy created. Mild effusion relieved. Standard neck cut made and head removed without difficulty noting superior cartilage irregularities. Acetabulum was exposed circumferentially. Acetabulum reamed from size 47 to size 55 noting satisfactory exposure of the subchondral bone with the size 55. Size 56 final component was seated with excellent press-fit characteristics. No additional fixation screws were required. Polyethylene insert placed without difficulty. Horizontal tilt was thought to be approximately 40° from Hil[place]re[place]'s line. Anteversion approximately 25°. Component was under the anterior inferior acetabular margin. Acetabulum was thoroughly irrigated before during and after component placement.      Attention turned to the proximal femur. Trochanteric hook placed and hip externally rotated eventually to 160° after complete posterior lateral releases. Standard piriformis landmarks were used. Broached  From size 8 to size 11 with good position relative to standard landmarks. Calcar reamer was passed after trials showed satisfactory recovery of limb lengths with the +5/36 trial. Final component seated incompletely relative to the reaming with excellent rotational stability. Limb lengths appeared to be restored best with  +1.5 x 36 mm head and neck adapter. Final components assembled and reduced. Wound thoroughly irrigated and closed in layers without a drain. Bleeding appeared to be reasonably well controlled at closure. Joint space injected with a combination of ropivacaine with epi through a spinal needle placed percutaneously during closure. Standard Prineo dressing applied. Final counts were correct. Patient stable to recovery having tolerated procedure well throughout.      Everett Ellis MD    Date: 12/11/2017  Time: 11:37 AM

## 2017-12-11 NOTE — ANESTHESIA PROCEDURE NOTES
Spinal Block    Patient location during procedure: OR  Indication:at surgeon's request  Performed By  Anesthesiologist: MILTON HERNANDEZ  CRNA: KATRINA DUNCAN  Preanesthetic Checklist  Completed: patient identified, site marked, surgical consent, pre-op evaluation, timeout performed, IV checked, risks and benefits discussed and monitors and equipment checked  Spinal Block Prep:  Patient Position:sitting  Sterile Tech:cap, gloves, sterile barriers and mask  Prep:Chloraprep  Patient Monitoring:blood pressure monitoring, continuous pulse oximetry and EKG  Spinal Block Procedure  Approach:midline  Guidance:landmark technique and palpation technique  Location:L4-L5  Needle Type:Gem  Needle Gauge:25 G  Placement of Spinal needle event:cerebrospinal fluid aspirated    Fluid Appearance:clear  Post Assessment  Patient Tolerance:patient tolerated the procedure well with no apparent complications  Complications no  Additional Notes  Procedure:  Pt assisted to sitting position, with legs in position of comfort over side of bed.  Pt. instructed in optimal spine presentation, the spine was prepped/ Draped and the skin at insertion site was anesthetized with 1% Lidocaine 2 ml.  The spinal needle was then advanced until CSF flow was obtained and LA was injected:      Marcaine 0.5% PSF injected 2 Mg.     Marcaine 0.75% PSF injected 0 Mg

## 2017-12-11 NOTE — PLAN OF CARE
Problem: Pain, Acute (Adult)  Goal: Identify Related Risk Factors and Signs and Symptoms  Outcome: Outcome(s) achieved Date Met:  12/11/17 12/11/17 1517   Pain, Acute   Related Risk Factors (Acute Pain) disease process;knowledge deficit;surgery   Signs and Symptoms (Acute Pain) BADLs/IADLs reluctance/inability to perform;guarding/abnormal posturing/positioning;verbalization of pain descriptors       Goal: Acceptable Pain Control/Comfort Level  Outcome: Ongoing (interventions implemented as appropriate)    12/11/17 1517   Pain, Acute (Adult)   Acceptable Pain Control/Comfort Level making progress toward outcome

## 2017-12-12 LAB
ANION GAP SERPL CALCULATED.3IONS-SCNC: 6 MMOL/L (ref 3–11)
BASOPHILS # BLD AUTO: 0.01 10*3/MM3 (ref 0–0.2)
BASOPHILS NFR BLD AUTO: 0.1 % (ref 0–1)
BUN BLD-MCNC: 16 MG/DL (ref 9–23)
BUN/CREAT SERPL: 17.8 (ref 7–25)
CALCIUM SPEC-SCNC: 8 MG/DL (ref 8.7–10.4)
CHLORIDE SERPL-SCNC: 107 MMOL/L (ref 99–109)
CO2 SERPL-SCNC: 26 MMOL/L (ref 20–31)
CREAT BLD-MCNC: 0.9 MG/DL (ref 0.6–1.3)
DEPRECATED RDW RBC AUTO: 48.7 FL (ref 37–54)
EOSINOPHIL # BLD AUTO: 0 10*3/MM3 (ref 0–0.3)
EOSINOPHIL NFR BLD AUTO: 0 % (ref 0–3)
ERYTHROCYTE [DISTWIDTH] IN BLOOD BY AUTOMATED COUNT: 14 % (ref 11.3–14.5)
GFR SERPL CREATININE-BSD FRML MDRD: 63 ML/MIN/1.73
GLUCOSE BLD-MCNC: 114 MG/DL (ref 70–100)
HCT VFR BLD AUTO: 33.3 % (ref 34.5–44)
HGB BLD-MCNC: 10.6 G/DL (ref 11.5–15.5)
IMM GRANULOCYTES # BLD: 0.02 10*3/MM3 (ref 0–0.03)
IMM GRANULOCYTES NFR BLD: 0.3 % (ref 0–0.6)
LYMPHOCYTES # BLD AUTO: 1.21 10*3/MM3 (ref 0.6–4.8)
LYMPHOCYTES NFR BLD AUTO: 15.2 % (ref 24–44)
MCH RBC QN AUTO: 29.9 PG (ref 27–31)
MCHC RBC AUTO-ENTMCNC: 31.8 G/DL (ref 32–36)
MCV RBC AUTO: 93.8 FL (ref 80–99)
MONOCYTES # BLD AUTO: 0.78 10*3/MM3 (ref 0–1)
MONOCYTES NFR BLD AUTO: 9.8 % (ref 0–12)
NEUTROPHILS # BLD AUTO: 5.96 10*3/MM3 (ref 1.5–8.3)
NEUTROPHILS NFR BLD AUTO: 74.6 % (ref 41–71)
PLATELET # BLD AUTO: 241 10*3/MM3 (ref 150–450)
PMV BLD AUTO: 9.6 FL (ref 6–12)
POTASSIUM BLD-SCNC: 3.8 MMOL/L (ref 3.5–5.5)
RBC # BLD AUTO: 3.55 10*6/MM3 (ref 3.89–5.14)
SODIUM BLD-SCNC: 139 MMOL/L (ref 132–146)
WBC NRBC COR # BLD: 7.98 10*3/MM3 (ref 3.5–10.8)

## 2017-12-12 PROCEDURE — 80048 BASIC METABOLIC PNL TOTAL CA: CPT | Performed by: NURSE PRACTITIONER

## 2017-12-12 PROCEDURE — 25010000003 CEFAZOLIN IN DEXTROSE 2-4 GM/100ML-% SOLUTION: Performed by: ORTHOPAEDIC SURGERY

## 2017-12-12 PROCEDURE — 97116 GAIT TRAINING THERAPY: CPT

## 2017-12-12 PROCEDURE — 97166 OT EVAL MOD COMPLEX 45 MIN: CPT

## 2017-12-12 PROCEDURE — 85025 COMPLETE CBC W/AUTO DIFF WBC: CPT | Performed by: ORTHOPAEDIC SURGERY

## 2017-12-12 PROCEDURE — 25010000002 HYDROMORPHONE PER 4 MG: Performed by: INTERNAL MEDICINE

## 2017-12-12 PROCEDURE — 97530 THERAPEUTIC ACTIVITIES: CPT

## 2017-12-12 PROCEDURE — 25010000002 ONDANSETRON PER 1 MG: Performed by: NURSE PRACTITIONER

## 2017-12-12 PROCEDURE — 97110 THERAPEUTIC EXERCISES: CPT

## 2017-12-12 PROCEDURE — 25010000002 PROCHLORPERAZINE EDISYLATE PER 10 MG: Performed by: INTERNAL MEDICINE

## 2017-12-12 RX ADMIN — ONDANSETRON 4 MG: 2 INJECTION INTRAMUSCULAR; INTRAVENOUS at 04:57

## 2017-12-12 RX ADMIN — OXYCODONE AND ACETAMINOPHEN 1 TABLET: 5; 325 TABLET ORAL at 14:26

## 2017-12-12 RX ADMIN — CEFAZOLIN SODIUM 2 G: 2 INJECTION, SOLUTION INTRAVENOUS at 02:30

## 2017-12-12 RX ADMIN — OXYCODONE AND ACETAMINOPHEN 1 TABLET: 5; 325 TABLET ORAL at 07:00

## 2017-12-12 RX ADMIN — DOCUSATE SODIUM 100 MG: 100 CAPSULE, LIQUID FILLED ORAL at 08:38

## 2017-12-12 RX ADMIN — Medication 2 TABLET: at 17:18

## 2017-12-12 RX ADMIN — ASPIRIN 325 MG: 325 TABLET, DELAYED RELEASE ORAL at 08:37

## 2017-12-12 RX ADMIN — Medication 1 CAPSULE: at 08:37

## 2017-12-12 RX ADMIN — OXYCODONE AND ACETAMINOPHEN 1 TABLET: 5; 325 TABLET ORAL at 04:56

## 2017-12-12 RX ADMIN — ESCITALOPRAM OXALATE 20 MG: 20 TABLET ORAL at 08:37

## 2017-12-12 RX ADMIN — LEVOTHYROXINE SODIUM 100 MCG: 100 TABLET ORAL at 08:37

## 2017-12-12 RX ADMIN — DOCUSATE SODIUM 100 MG: 100 CAPSULE, LIQUID FILLED ORAL at 17:18

## 2017-12-12 RX ADMIN — PROCHLORPERAZINE EDISYLATE 5 MG: 5 INJECTION INTRAMUSCULAR; INTRAVENOUS at 00:29

## 2017-12-12 RX ADMIN — Medication 2 TABLET: at 08:38

## 2017-12-12 RX ADMIN — TRAZODONE HYDROCHLORIDE 300 MG: 100 TABLET ORAL at 20:34

## 2017-12-12 RX ADMIN — HYDROMORPHONE HYDROCHLORIDE 0.5 MG: 2 INJECTION INTRAMUSCULAR; INTRAVENOUS; SUBCUTANEOUS at 00:29

## 2017-12-12 RX ADMIN — HYDROMORPHONE HYDROCHLORIDE 0.5 MG: 2 INJECTION INTRAMUSCULAR; INTRAVENOUS; SUBCUTANEOUS at 08:37

## 2017-12-12 RX ADMIN — OXYCODONE AND ACETAMINOPHEN 1 TABLET: 5; 325 TABLET ORAL at 21:58

## 2017-12-12 NOTE — PLAN OF CARE
Problem: Patient Care Overview (Adult)  Goal: Plan of Care Review  Outcome: Ongoing (interventions implemented as appropriate)    12/12/17 0536   Coping/Psychosocial Response Interventions   Plan Of Care Reviewed With patient   Patient Care Overview   Progress improving         Problem: Pain, Acute (Adult)  Goal: Identify Related Risk Factors and Signs and Symptoms  Outcome: Ongoing (interventions implemented as appropriate)    12/12/17 0536   Pain, Acute   Related Risk Factors (Acute Pain) surgery   Signs and Symptoms (Acute Pain) verbalization of pain descriptors;BADLs/IADLs reluctance/inability to perform;facial mask of pain/grimace;fatigue/weakness       Goal: Acceptable Pain Control/Comfort Level  Outcome: Ongoing (interventions implemented as appropriate)    Problem: Fall Risk (Adult)  Goal: Identify Related Risk Factors and Signs and Symptoms  Outcome: Ongoing (interventions implemented as appropriate)    12/12/17 0536   Fall Risk   Fall Risk: Related Risk Factors age-related changes;gait/mobility problems;history of falls;environment unfamiliar   Fall Risk: Signs and Symptoms presence of risk factors       Goal: Absence of Falls  Outcome: Ongoing (interventions implemented as appropriate)

## 2017-12-12 NOTE — PLAN OF CARE
Problem: Patient Care Overview (Adult)  Goal: Plan of Care Review  Outcome: Ongoing (interventions implemented as appropriate)    12/12/17 0947   Coping/Psychosocial Response Interventions   Plan Of Care Reviewed With patient   Patient Care Overview   Progress improving   Outcome Evaluation   Outcome Summary/Follow up Plan OT garyal complete. Pt met functional goals for LB dressing with conditional independence. Requiring CGA for ambulation and transfers. Recommend discharge home with assist and home health.          Problem: Inpatient Occupational Therapy  Goal: Bed Mobility Goal LTG- OT  Outcome: Ongoing (interventions implemented as appropriate)    12/12/17 0947   Bed Mobility OT LTG   Bed Mobility OT LTG, Date Established 12/12/17   Bed Mobility OT LTG, Time to Achieve 5 days   Bed Mobility OT LTG, Activity Type scoot/bridge;supine to sit/sit to supine   Bed Mobility OT LTG, [place] Level conditional independence   Bed Mobility OT LTG, Assist Device bed rails;leg    Bed Mobility OT LTG, Outcome goal ongoing       Goal: Transfer Training Goal 1 LTG- OT  Outcome: Ongoing (interventions implemented as appropriate)    12/12/17 0947   Transfer Training OT LTG   Transfer Training OT LTG, Date Established 12/12/17   Transfer Training OT LTG, Time to Achieve 5 days   Transfer Training OT LTG, Activity Type sit to stand/stand to sit;toilet   Transfer Training OT LTG, [place] Level contact guard assist;verbal cues required   Transfer Training OT LTG, Assist Device walker, rolling   Transfer Training OT LTG, Outcome goal ongoing       Goal: Patient Education Goal LTG- OT  Outcome: Ongoing (interventions implemented as appropriate)    12/12/17 0947   Patient Education OT LTG   Patient Education OT LTG, Date Established 12/12/17   Patient Education OT LTG, Time to Achieve 5 days   Patient Education OT LTG, Education Type precautions per surgeon;positioning;posture/body mechanics;1 hand/kaiser technique;adaptive  equipment mgmt   Patient Education OT LTG Outcome goal ongoing       Goal: LB Dressing Goal LTG- OT  Outcome: Outcome(s) achieved Date Met:  12/12/17 12/12/17 0947   LB Dressing OT LTG   LB Dressing Goal OT LTG, Date Established 12/12/17   LB Dressing Goal OT LTG, Time to Achieve 5 days   LB Dressing Goal OT LTG, Activity Type Pt will don/doff socks    LB Dressing Goal OT LTG, [place] Level conditional independence   LB Dressing Goal OT LTG, Adaptive Equipment reacher;sock-aid   LB Dressing Goal OT LTG, Outcome goal met

## 2017-12-12 NOTE — PLAN OF CARE
Problem: Patient Care Overview (Adult)  Goal: Plan of Care Review  Outcome: Ongoing (interventions implemented as appropriate)    12/12/17 0912   Coping/Psychosocial Response Interventions   Plan Of Care Reviewed With patient   Patient Care Overview   Progress improving   Outcome Evaluation   Outcome Summary/Follow up Plan patient able to increased gait distance with use of rolling walker for support, patient with step to gait due to pain unbale to progress with step through. Follow with chair for safety.         Problem: Inpatient Physical Therapy  Goal: Bed Mobility Goal LTG- PT  Outcome: Ongoing (interventions implemented as appropriate)    12/11/17 1535 12/12/17 0912   Bed Mobility PT LTG   Bed Mobility PT LTG, Date Established 12/11/17 --    Bed Mobility PT LTG, Time to Achieve 3 days --    Bed Mobility PT LTG, Activity Type supine to sit/sit to supine --    Bed Mobility PT LTG, [place] Level conditional independence --    Bed Mobility PT LTG, Date Goal Reviewed --  12/12/17   Bed Mobility PT LTG, Outcome --  goal ongoing       Goal: Transfer Training Goal 1 LTG- PT  Outcome: Ongoing (interventions implemented as appropriate)    12/11/17 1535 12/12/17 0912   Transfer Training PT LTG   Transfer Training PT LTG, Date Established 12/11/17 --    Transfer Training PT LTG, Time to Achieve 3 days --    Transfer Training PT LTG, Activity Type sit to stand/stand to sit --    Transfer Training PT LTG, [place] Level conditional independence --    Transfer Training PT LTG, Assist Device walker, rolling --    Transfer Training PT LTG, Date Goal Reviewed --  12/12/17   Transfer Training PT LTG, Outcome --  goal ongoing       Goal: Gait Training Goal LTG- PT  Outcome: Ongoing (interventions implemented as appropriate)    12/11/17 1535 12/12/17 0912   Gait Training PT LTG   Gait Training Goal PT LTG, Date Established 12/11/17 --    Gait Training Goal PT LTG, Time to Achieve 3 days --    Gait Training Goal PT LTG,  [place] Level conditional independence --    Gait Training Goal PT LTG, Assist Device walker, rolling --    Gait Training Goal PT LTG, Distance to Achieve 500 feet --    Gait Training Goal PT LTG, Date Goal Reviewed --  12/12/17   Gait Training Goal PT LTG, Outcome --  goal ongoing       Goal: Stair Training Goal LTG- PT  Outcome: Ongoing (interventions implemented as appropriate)    12/11/17 1535 12/12/17 0912   Stair Training PT LTG   Stair Training Goal PT LTG, Date Established 12/11/17 --    Stair Training Goal PT LTG, Time to Achieve 3 days --    Stair Training Goal PT LTG, Number of Steps 1 --    Stair Training Goal PT LTG, [place] Level contact guard assist --    Stair Training Goal PT LTG, Assist Device walker, rolling;other (see comments)  (backwards) --    Stair Training Goal PT LTG, Date Goal Reviewed --  12/12/17   Stair Training Goal PT LTG, Outcome --  goal ongoing

## 2017-12-12 NOTE — THERAPY TREATMENT NOTE
Acute Care - Physical Therapy Treatment Note  [facility]     Patient Name: Dena Guillermo  : 1955  MRN: [id number]  Today's Date: 2017  Onset of Illness/Injury or Date of Surgery Date: 17  Date of Referral to PT: 17  Referring Physician: MD Caleb    Admit Date: 2017    Visit Dx:    ICD-10-CM ICD-9-CM   1. Impaired functional mobility, balance, gait, and endurance Z74.09 V49.89   2. Impaired mobility and ADLs Z74.09 799.89     Patient Active Problem List   Diagnosis   • Arthritis of left hip   • Status post total replacement of left hip   • CHAYITO on CPAP   • Hypothyroid   • Anxiety and depression               Adult Rehabilitation Note       17 1405 17 0836       Rehab Assessment/Intervention    Discipline physical therapist  - physical therapy assistant  -AS     Document Type therapy note (daily note)  - therapy note (daily note)  -AS     Subjective Information agree to therapy;complains of;pain  - agree to therapy;complains of;pain  -AS     Patient Effort, Rehab Treatment good  - good  -AS     Symptoms Noted During/After Treatment fatigue  - fatigue  -AS     Precautions/Limitations fall precautions;anterior hip precautions- left  -EH fall precautions;anterior hip precautions- left  -AS     Recorded by [] Elva Spicer, PT [AS] Lilli Peña, [facility]     Pain Assessment    Pain Assessment 0-10  - 0-10  -AS     Pain Score 6  -EH 6  -AS     Post Pain Score 7  -EH 5  -AS     Pain Type Acute pain;Surgical pain  - Acute pain;Surgical pain  -AS     Pain Location Hip  - Hip  -AS     Pain Orientation Left  -EH Left  -AS     Pain Descriptors Burning  -      Pain Intervention(s) Repositioned;Ambulation/increased activity;Medication (See MAR)   medicated idrectly after PT.  -EH Repositioned;Ambulation/increased activity;Medication (See MAR)   pre-medicated  -AS     Response to Interventions Pt tolerated  -EH tolerated  -AS     Recorded by [] Elva CHILEL  Nayan, PT [AS] Lilli Peña, [place]     Vision Assessment/Intervention    Visual Impairment WFL  -EH      Recorded by [EH] Elva Spicer PT      Cognitive Assessment/Intervention    Current Cognitive/Communication Assessment functional  -EH functional  -AS     Orientation Status oriented x 4  -EH oriented x 4  -AS     Follows Commands/Answers Questions 100% of the time;able to follow single-step instructions  -% of the time;able to follow single-step instructions  -AS     Personal Safety WNL/WFL  -EH WNL/WFL  -AS     Personal Safety Interventions fall prevention program maintained;gait belt;nonskid shoes/slippers when out of bed  -EH fall prevention program maintained;gait belt;nonskid shoes/slippers when out of bed;other (see comments)   exirt alarm  -AS     Recorded by [] Elva Spicer, PT [AS] Lilli Peña, [place]     Bed Mobility, Assessment/Treatment    Bed Mobility, Assistive Device leg ;head of bed elevated  -      Bed Mob, Supine to Sit, [place] verbal cues required;contact guard assist;other (see comments)   increased time  -EH verbal cues required;contact guard assist  -AS     Bed Mob, Sit to Supine, [place] verbal cues required;contact guard assist   bed flat  -EH not tested  -AS     Bed Mobility, Impairments pain;ROM decreased  -EH pain;ROM decreased  -AS     Bed Mobility, Comment VC for sequencing with use of leg .   - patient did well moving legs to EOB, CGA for L LE  -AS     Recorded by [] Elva Spicer, PT [AS] Lilli Peña, [place]     Transfer Assessment/Treatment    Transfers, Sit-Stand [place] verbal cues required;contact guard assist;2 person assist required  -EH verbal cues required;contact guard assist;2 person assist required  -AS     Transfers, Stand-Sit [place] verbal cues required;contact guard assist;2 person assist required  -EH verbal cues required;contact guard assist;2 person assist required  -AS     Transfers,  Sit-Stand-Sit, Assist Device rolling walker  -EH rolling walker  -AS     Transfer, Impairments ROM decreased;pain;strength decreased  -EH ROM decreased;pain;strength decreased  -AS     Transfer, Comment VC for HP and sequencing  -EH verbal cues for hand placement  -AS     Recorded by [] Elva Spicer, PT [AS] Lilli Peña PTA     Gait Assessment/Treatment    Gait, [place] Level verbal cues required;contact guard assist;2 person assist required  -EH verbal cues required;contact guard assist;2 person assist required  -AS     Gait, Assistive Device rolling walker  -EH rolling walker  -AS     Gait, Distance (Feet) 60  -EH 20  -AS     Gait, Gait Deviations left:;decreased heel strike;right:;step length decreased  - left:;decreased heel strike;step length decreased  -AS     Gait, Safety Issues  sequencing ability decreased;step length decreased;weight-shifting ability decreased  -AS     Gait, Impairments ROM decreased;strength decreased;pain  -EH ROM decreased;strength decreased;pain  -AS     Gait, Comment Pt ambulates with step to gait pattern with internal rotation BLEs. Pt improves toward step through gait pattern but continues to have shortened step length on RLE. VC given for increased step length and upright posture.  -EH patient ambulated to hallway with step to gait pattern due to pain. Followed with chair for safety.  -AS     Recorded by [] Elva Spicer, PT [AS] Lilli Peña PTA     Therapy Exercises    Exercise Protocols total hip  -EH total hip  -AS     Total Hip Exercises left:;10 reps;with assist;ankle pumps/circles;quad set;glut set;heel slides;hip abduction   pt uses leg  during ther ex.  -EH left:;10 reps;with assist;ankle pumps/circles;quad set;glut set;heel slides;hip abduction  -AS     Recorded by [EH] Elva Spicer, PT [AS] Lilli Peña PTA     Positioning and Restraints    Pre-Treatment Position in bed  -EH in bed  -AS     Post Treatment Position  chair  -EH chair  -AS     In Chair notified nsg;reclined;call light within reach;encouraged to call for assist;exit alarm on  -EH reclined;call light within reach;encouraged to call for assist;exit alarm on;with OT  -AS     Recorded by [] Elva Spicer, PT [AS] Lilli Peña, PTA       User Key  (r) = Recorded By, (t) = Taken By, (c) = Cosigned By    Initials Name Effective Dates     Elva Spicer, PT 06/19/15 -     AS Lilli Peña, PTA 06/22/15 -                 IP PT Goals       12/12/17 1448 12/12/17 0912 12/11/17 1535    Bed Mobility PT LTG    Bed Mobility PT LTG, Date Established   12/11/17  -LR    Bed Mobility PT LTG, Time to Achieve   3 days  -LR    Bed Mobility PT LTG, Activity Type   supine to sit/sit to supine  -LR    Bed Mobility PT LTG, [place] Level   conditional independence  -LR    Bed Mobility PT LTG, Date Goal Reviewed  12/12/17  -AS 12/11/17  -LR    Bed Mobility PT LTG, Outcome goal ongoing  -EH goal ongoing  -AS goal ongoing  -LR    Transfer Training PT LTG    Transfer Training PT LTG, Date Established   12/11/17  -LR    Transfer Training PT LTG, Time to Achieve   3 days  -LR    Transfer Training PT LTG, Activity Type   sit to stand/stand to sit  -LR    Transfer Training PT LTG, [place] Level   conditional independence  -LR    Transfer Training PT LTG, Assist Device   walker, rolling  -LR    Transfer Training PT  LTG, Date Goal Reviewed  12/12/17  -AS 12/11/17  -LR    Transfer Training PT LTG, Outcome goal ongoing  - goal ongoing  -AS goal ongoing  -LR    Gait Training PT LTG    Gait Training Goal PT LTG, Date Established   12/11/17  -LR    Gait Training Goal PT LTG, Time to Achieve   3 days  -LR    Gait Training Goal PT LTG, [place] Level   conditional independence  -LR    Gait Training Goal PT LTG, Assist Device   walker, rolling  -LR    Gait Training Goal PT LTG, Distance to Achieve   500 feet  -LR    Gait Training Goal PT LTG, Date Goal Reviewed   12/12/17  -AS 12/11/17  -LR    Gait Training Goal PT LTG, Outcome goal ongoing  -EH goal ongoing  -AS goal ongoing  -LR    Stair Training PT LTG    Stair Training Goal PT LTG, Date Established   12/11/17  -LR    Stair Training Goal PT LTG, Time to Achieve   3 days  -LR    Stair Training Goal PT LTG, Number of Steps   1  -LR    Stair Training Goal PT LTG, [place] Level   contact guard assist  -LR    Stair Training Goal PT LTG, Assist Device   walker, rolling;other (see comments)   backwards  -LR    Stair Training Goal PT LTG, Date Goal Reviewed  12/12/17  -AS 12/11/17  -LR    Stair Training Goal PT LTG, Outcome goal ongoing  -EH goal ongoing  -AS goal ongoing  -LR      User Key  (r) = Recorded By, (t) = Taken By, (c) = Cosigned By    Initials Name Provider Type     Elva Spicer, PT Physical Therapist    LR Sally Barahona, PT Physical Therapist    AS Lilli Peña, PTA Physical Therapy Assistant          Physical Therapy Education     Title: PT OT SLP Therapies (Done)     Topic: Physical Therapy (Done)     Point: Mobility training (Done)    Learning Progress Summary    Learner Readiness Method Response Comment Documented by Status   Patient Acceptance E VU,NR   12/12/17 1448 Done    Acceptance E NR  AS 12/12/17 0912 Active    Acceptance D,E VU,NR Educated on HEP, hip precautions, and weight bearing status. LR 12/11/17 1625 Done               Point: Home exercise program (Done)    Learning Progress Summary    Learner Readiness Method Response Comment Documented by Status   Patient Acceptance E VU,NR   12/12/17 1448 Done    Acceptance E NR  AS 12/12/17 0912 Active    Acceptance D,E VU,NR Educated on HEP, hip precautions, and weight bearing status. LR 12/11/17 1625 Done               Point: Body mechanics (Done)    Learning Progress Summary    Learner Readiness Method Response Comment Documented by Status   Patient Acceptance E VU,NR   12/12/17 1448 Done    Acceptance E NR  AS 12/12/17 0912  Active    Acceptance D,E VU,NR Educated on HEP, hip precautions, and weight bearing status. LR 12/11/17 1625 Done               Point: Precautions (Done)    Learning Progress Summary    Learner Readiness Method Response Comment Documented by Status   Patient Acceptance E VU,NR   12/12/17 1448 Done    Acceptance E NR  AS 12/12/17 0912 Active    Acceptance D,E VU,NR Educated on HEP, hip precautions, and weight bearing status. LR 12/11/17 1625 Done                      User Key     Initials Effective Dates Name Provider Type Discipline     06/19/15 -  Elva Spicer, PT Physical Therapist PT     06/19/15 -  Sally Barahona, PT Physical Therapist PT    AS 06/22/15 -  Lilli Peña, PTA Physical Therapy Assistant PT                    PT Recommendation and Plan  Anticipated Equipment Needs At Discharge:  (none)  Anticipated Discharge Disposition: home with assist, home with home health  Planned Therapy Interventions: balance training, bed mobility training, gait training, home exercise program, patient/family education, ROM (Range of Motion), stair training, strengthening, transfer training  PT Frequency: 2 times/day  Plan of Care Review  Plan Of Care Reviewed With: patient  Progress: improving  Outcome Summary/Follow up Plan: Pt ambulates in [place] for total of 60 ft with RWx, followed with chair and 1 sitting rest break.           Outcome Measures       12/12/17 1405 12/12/17 0847 12/12/17 0836    How much help from another person do you currently need...    Turning from your back to your side while in flat bed without using bedrails? 3  -EH  3  -AS    Moving from lying on back to sitting on the side of a flat bed without bedrails? 3  -EH  3  -AS    Moving to and from a bed to a chair (including a wheelchair)? 3  -EH  3  -AS    Standing up from a chair using your arms (e.g., wheelchair, bedside chair)? 3  -EH  3  -AS    Climbing 3-5 steps with a railing? 2  -EH  2  -AS    To walk in hospital room? 3   -EH  3  -AS    AM-PAC 6 Clicks Score 17  -EH  17  -AS    How much help from another is currently needed...    Putting on and taking off regular lower body clothing?  4  -HK     Bathing (including washing, rinsing, and drying)  3  -HK     Toileting (which includes using toilet bed pan or urinal)  3  -HK     Putting on and taking off regular upper body clothing  4  -HK     Taking care of personal grooming (such as brushing teeth)  4  -HK     Eating meals  4  -HK     Score  22  -HK     Functional Assessment    Outcome Measure Options AM-PAC 6 Clicks Basic Mobility (PT)  - AM-PAC 6 Clicks Daily Activity (OT)  -HK AM-PAC 6 Clicks Basic Mobility (PT)  -AS      12/11/17 1535          How much help from another person do you currently need...    Turning from your back to your side while in flat bed without using bedrails? 3  -LR      Moving from lying on back to sitting on the side of a flat bed without bedrails? 3  -LR      Moving to and from a bed to a chair (including a wheelchair)? 1  -LR      Standing up from a chair using your arms (e.g., wheelchair, bedside chair)? 3  -LR      Climbing 3-5 steps with a railing? 1  -LR      To walk in hospital room? 1  -LR      AM-PAC 6 Clicks Score 12  -LR      Functional Assessment    Outcome Measure Options AM-PAC 6 Clicks Basic Mobility (PT)  -LR        User Key  (r) = Recorded By, (t) = Taken By, (c) = Cosigned By    Initials Name Provider Type     Elva Spicer, PT Physical Therapist    LR Sally Barahona, PT Physical Therapist    AS Lilli Peña, PTA Physical Therapy Assistant     Nilda Moore, OT Occupational Therapist           Time Calculation:         PT Charges       12/12/17 1450 12/12/17 0913       Time Calculation    Start Time 1405  - 0836  -AS     PT Received On 12/12/17  - 12/12/17  -AS     PT Goal Re-Cert Due Date 12/21/17  - 12/21/17  -AS     Time Calculation- PT    Total Timed Code Minutes- PT 28 minute(s)  - 23 minute(s)  -AS        User Key  (r) = Recorded By, (t) = Taken By, (c) = Cosigned By    Initials Name Provider Type     Elva Spicer, PT Physical Therapist    AS Lilli Peña, PTA Physical Therapy Assistant          Therapy Charges for Today     Code Description Service Date Service Provider Modifiers Qty    93431817752 HC GAIT TRAINING EA 15 MIN 12/12/2017 Elva Spicer, PT GP 1    50822259982 HC PT THER PROC EA 15 MIN 12/12/2017 Elva Spicer, PT GP 1    12614944975 HC PT THER SUPP EA 15 MIN 12/12/2017 Elva Spicer, PT GP 2          PT G-Codes  Outcome Measure Options: AM-PAC 6 Clicks Basic Mobility (PT)    Elva Spicer, PT  12/12/2017

## 2017-12-12 NOTE — PLAN OF CARE
Problem: Pain, Acute (Adult)  Goal: Identify Related Risk Factors and Signs and Symptoms  Outcome: Outcome(s) achieved Date Met:  12/12/17 12/12/17 0536   Pain, Acute   Related Risk Factors (Acute Pain) surgery   Signs and Symptoms (Acute Pain) verbalization of pain descriptors;BADLs/IADLs reluctance/inability to perform;facial mask of pain/grimace;fatigue/weakness       Goal: Acceptable Pain Control/Comfort Level  Outcome: Ongoing (interventions implemented as appropriate)    12/12/17 1842   Pain, Acute (Adult)   Acceptable Pain Control/Comfort Level making progress toward outcome         Problem: Fall Risk (Adult)  Goal: Absence of Falls  Outcome: Ongoing (interventions implemented as appropriate)

## 2017-12-12 NOTE — PROGRESS NOTES
"IM progress note      Dena Guillermo  [id number]  1955     LOS: 1 day     Attending: Everett Ellis MD    Primary Care Provider: Elisa Landry MD      Chief Complaint/Reason for visit:  No chief complaint on file.      Subjective   Feels better. Pain control much improved from last night. No f/c/n/vom today.    Objective     Vital Signs  Blood pressure 129/67, pulse 79, temperature 98.2 °F (36.8 °C), temperature source Oral, resp. rate 17, height 170.2 cm (67\"), weight 106 kg (233 lb 0.4 oz), SpO2 93 %.  Temp (24hrs), Av.4 °F (36.9 °C), Min:97.8 °F (36.6 °C), Max:99 °F (37.2 °C)      Intake/Output:    Intake/Output Summary (Last 24 hours) at 17 1345  Last data filed at 17 1300   Gross per 24 hour   Intake             2504 ml   Output             1000 ml   Net             1504 ml          Physical Therapy:patient able to increased gait distance with use of rolling walker for support, patient with step to gait due to pain unbale to progress with step through. Follow with chair for safety.    Physical Exam:     General Appearance:    Alert, cooperative, in no acute distress   Head:    Normocephalic, without obvious abnormality, atraumatic    Lungs:     Normal effort, symmetric chest rise, no crepitus, clear to      auscultation bilaterally                 Heart:    Regular rhythm and normal rate, normal S1 and S2    Abdomen:     Normal bowel sounds, no masses, no organomegaly, soft        non-tender, non-distended, no guarding, no rebound                tenderness   Extremities:   No clubbing, cyanosis or edema.  No deformities.    Left hip with CDI dressing/prineo   Pulses:   Pulses palpable and equal bilaterally   Skin:   No bleeding, bruising or rash   Neurologic:   Moves all extremities with no obvious focal motor deficit.  Cranial nerves 2 - 12 grossly intact.  Flexion and dorsiflexion intact bilateral feet.       Results Review:     I reviewed the patient's new clinical results. "     Results from last 7 days  Lab Units 12/12/17  0558   WBC 10*3/mm3 7.98   HEMOGLOBIN g/dL 10.6*   HEMATOCRIT % 33.3*   PLATELETS 10*3/mm3 241       Results from last 7 days  Lab Units 12/12/17  0558   SODIUM mmol/L 139   POTASSIUM mmol/L 3.8   CHLORIDE mmol/L 107   CO2 mmol/L 26.0   BUN mg/dL 16   CREATININE mg/dL 0.90   CALCIUM mg/dL 8.0*   GLUCOSE mg/dL 114*     I reviewed the patient's new imaging including images and reports.    All medications reviewed.     aspirin 325 mg Oral Daily   docusate sodium 100 mg Oral BID   escitalopram 20 mg Oral Daily   lactobacillus acidophilus 1 capsule Oral Daily   levothyroxine 100 mcg Oral Daily   sennosides-docusate sodium 2 tablet Oral BID   traZODone 300 mg Oral Nightly       acetaminophen 650 mg Q4H PRN   ALPRAZolam 0.5 mg Nightly PRN   bisacodyl 10 mg Daily PRN   bisacodyl 10 mg Daily PRN   diphenhydrAMINE 25 mg Q6H PRN   hydrALAZINE 10 mg Q6H PRN   HYDROmorphone 0.5 mg Q3H PRN   hydrOXYzine 25 mg TID PRN   magnesium hydroxide 10 mL Daily PRN   ondansetron 4 mg Q6H PRN   oxyCODONE-acetaminophen 1 tablet Q4H PRN   prochlorperazine 5 mg Q6H PRN   Or     prochlorperazine 5 mg Q6H PRN   Or     prochlorperazine 25 mg Q12H PRN   sodium chloride 500 mL TID PRN   traMADol 50 mg Q6H PRN       Assessment/Plan     Principal Problem:    Status post total replacement of left hip  Active Problems:    Arthritis of left hip    CHAYITO on CPAP    Hypothyroid    Anxiety and depression    Acute postop pain.    Acute blood loss anemia, mild, asymptomatic      Plan  1. PT/OT, continue. Weight bearing as tolerated LLE.  2. Pain control-prns   3. IS-encouraged  4. DVT proph-mech/asa.  5. Bowel regimen  6. Monitor post-op labs    CHAYITO  - CPAP at night  - monitor O2 sats     Hypothyroid  - continue home Synthroid     Anxiety and depression  - continue home Xanax, vistaril, trazadone and lexapro    D/c home tomorrow possibly. Discussed with pt.    Estefania Singh MD  12/12/17  1:45 PM

## 2017-12-12 NOTE — THERAPY EVALUATION
Acute Care - Occupational Therapy Initial Evaluation  [facility]     Patient Name: Dena Guillermo  : 1955  MRN: [id number]  Today's Date: 2017  Onset of Illness/Injury or Date of Surgery Date: 17  Date of Referral to OT: 17  Referring Physician: MD Caleb    Admit Date: 2017       ICD-10-CM ICD-9-CM   1. Impaired functional mobility, balance, gait, and endurance Z74.09 V49.89   2. Impaired mobility and ADLs Z74.09 799.89     Patient Active Problem List   Diagnosis   • Arthritis of left hip   • Status post total replacement of left hip   • CHAYITO on CPAP   • Hypothyroid   • Anxiety and depression     Past Medical History:   Diagnosis Date   • Anxiety    • Arthritis    • Disease of thyroid gland    • Joint pain    • CHAYITO on CPAP    • Pericarditis    • Seasonal allergies    • Wears glasses      Past Surgical History:   Procedure Laterality Date   • BLADDER SURGERY      with mesh    • CHOLECYSTECTOMY     • COLONOSCOPY     • HIP ARTHROPLASTY Right 2016    [place] in [place]    • HYSTERECTOMY     • WRIST SURGERY Right           OT ASSESSMENT FLOWSHEET (last 72 hours)      OT Evaluation       17 0919 17 0847 17 0836 17 2135 17 1535    Rehab Evaluation    Document Type  evaluation  -HK therapy note (daily note)  -AS  evaluation  -LR    Subjective Information  agree to therapy;complains of;pain  -HK agree to therapy;complains of;pain  -AS  numbness;agree to therapy;complains of;pain   reports mild numbness from spinal still lingering  -LR    Patient Effort, Rehab Treatment  good  -HK good  -AS  good  -LR    Symptoms Noted During/After Treatment  fatigue  -HK fatigue  -AS  none  -LR    General Information    Patient Profile Review  yes  -HK   yes  -LR    Onset of Illness/Injury or Date of Surgery Date  17  -HK   17  -LR    Referring Physician  MD Caleb  -HK   MD Caleb  -LR    General Observations  Pt received supine in bed with  IV intact.   -HK   Patient supine in bed upon arrival. IV, SCDs.   -LR    Pertinent History Of Current Problem  Pt is a 62 YOF admitted for surigcal management of intractable L hip pain and dysfunction that failed to improve with conservative measures. Pt is POD #1 s/p L anterior ADENIKE.   -HK   Patient presents for surgical management of persistent and progressive L hip pain and dysfunction that has failed to improve with conservative management. Pt underwent R ADENIKE via posterior approach 12/2016.   -LR    Precautions/Limitations  fall precautions;hip precautions- left  -HK fall precautions;anterior hip precautions- left  -AS  fall precautions;anterior hip precautions- left  -LR    Prior Level of Function  min assist:;all household mobility;community mobility;gait;transfer;bed mobility;ADL's;home management;cooking;cleaning;driving;shopping  -HK   min assist:;all household mobility;community mobility;gait;transfer;bed mobility  -LR    Equipment Currently Used at Home cane, quad;walker, rolling  -SP cane, quad;walker, rolling  -HK   grab bar;shower chair;raised toilet;walker, rolling;cane, straight;cane, quad   built in shower seats, not currently using AD  -LR    Plans/Goals Discussed With  patient;agreed upon  -HK   patient;agreed upon  -LR    Risks Reviewed  patient:;nausea/vomiting;dizziness;LOB;increased discomfort;change in vital signs;increased drainage;lines disloged  -HK   patient:;LOB;nausea/vomiting;dizziness;increased discomfort;lines disloged  -LR    Benefits Reviewed  patient:;improve function;increase independence;increase strength;increase balance;decrease pain;decrease risk of DVT;improve skin integrity;increase knowledge  -HK   patient:;improve function;increase independence;increase strength;increase balance;decrease pain;increase knowledge  -LR    Barriers to Rehab  previous functional deficit  -HK   previous functional deficit  -LR    Living Environment    Lives With spouse  -SP spouse  -HK   spouse   -LR    Living Arrangements house  -SP house  -HK   house  -LR    Home Accessibility no concerns  -SP no concerns  -HK   bed and bath on same level;house is not wheelchair accessible;grab bars present (bathtub);grab bars present (toilet);stairs to enter home;other (see comments)   walk in shower  -LR    Number of Stairs to Enter Home     1  -LR    Stair Railings at Home none  -SP none  -HK   none  -LR    Type of Financial/Environmental Concern none  -SP    none  -LR    Transportation Available family or friend will provide  -SP    family or friend will provide  -LR    Living Environment Comment     Patient reports her  is available to assist at all times upon d/c home.   -LR    Clinical Impression    Date of Referral to OT  12/11/17  -HK       OT Diagnosis  Decreased independence with ADLs and Mobility.   -HK       Patient/Family Goals Statement  Pt would like to improve and return home.   -HK       Criteria for Skilled Therapeutic Interventions Met  yes;treatment indicated  -HK       Rehab Potential  good, to achieve stated therapy goals  -HK       Therapy Frequency  daily   per priority policy  -HK       Anticipated Equipment Needs At Discharge  other (see comments)   TBD  -HK       Anticipated Discharge Disposition  home with assist;home with home health  -HK       Functional Level Prior    Ambulation 0-->independent  -SP        Transferring 0-->independent  -SP        Toileting 0-->independent  -SP        Bathing 0-->independent  -SP        Dressing 0-->independent  -SP        Eating 0-->independent  -SP        Communication 0-->understands/communicates without difficulty  -SP        Swallowing 0-->swallows foods/liquids without difficulty  -SP        Pain Assessment    Pain Assessment  0-10  -HK 0-10  -AS  0-10  -LR    Pain Score  6  -HK 6  -AS  8  -LR    Post Pain Score  4  -HK 5  -AS  8  -LR    Pain Type  Acute pain;Surgical pain  -HK Acute pain;Surgical pain  -AS  Acute pain  -LR    Pain Location  Hip   -HK Hip  -AS  Hip  -LR    Pain Orientation  Left  -HK Left  -AS  Left;Anterior  -LR    Pain Descriptors     Burning  -LR    Pain Intervention(s)  Repositioned;Ambulation/increased activity  -HK Repositioned;Ambulation/increased activity;Medication (See MAR)   pre-medicated  -AS  Repositioned;Ambulation/increased activity  -LR    Response to Interventions  Pt tolerated  -HK tolerated  -AS      Vision Assessment/Intervention    Visual Impairment  WFL  -HK   WFL  -LR    Cognitive Assessment/Intervention    Current Cognitive/Communication Assessment  functional  -HK functional  -AS  functional  -LR    Orientation Status  oriented x 4  -HK oriented x 4  -AS  oriented x 4;required verbal cueing (specifiy in comments)  -LR    Follows Commands/Answers Questions  100% of the time;able to follow single-step instructions  -% of the time;able to follow single-step instructions  -AS  100% of the time;able to follow single-step instructions;needs cueing;needs repetition  -LR    Personal Safety  WNL/WFL  -HK WNL/WFL  -AS  WNL/WFL  -LR    Personal Safety Interventions  fall prevention program maintained;gait belt;nonskid shoes/slippers when out of bed  -HK fall prevention program maintained;gait belt;nonskid shoes/slippers when out of bed;other (see comments)   exirt alarm  -AS      ROM (Range of Motion)    General ROM  no range of motion deficits identified  -HK   lower extremity range of motion deficits identified  -LR    MMT (Manual Muscle Testing)    General MMT Assessment  no strength deficits identified   not formally assessed; WFL  -HK   lower extremity strength deficits identified  -LR    Muscle Tone Assessment    Muscle Tone Assessment    Bilateral Upper Extremities;LLE;RLE  -AB     Bilateral Upper Extremities Muscle Tone Assessment    associated movements noted  -AB     LLE Muscle Tone Assessment    moderately decreased tone  -AB     RLE Muscle Tone Assessment    associated movements noted  -AB     Mobility  Assessment/Training    Extremity Weight-Bearing Status     left lower extremity  -LR    Left Lower Extremity Weight-Bearing     weight-bearing as tolerated  -LR    Bed Mobility, Assessment/Treatment    Bed Mobility, Assistive Device     head of bed elevated;bed rails  -LR    Bed Mob, Supine to Sit, [place]  verbal cues required;contact guard assist  -HK verbal cues required;contact guard assist  -AS  verbal cues required;contact guard assist  -LR    Bed Mob, Sit to Supine, [place]  not tested  -HK not tested  -AS  verbal cues required;contact guard assist  -LR    Bed Mobility, Safety Issues     decreased use of legs for bridging/pushing  -LR    Bed Mobility, Impairments  pain;ROM decreased  -HK pain;ROM decreased  -AS  ROM decreased;strength decreased;pain  -LR    Bed Mobility, Comment  Verbal cues for sequencing. OT issued leg  and educated pt on use for bed mobility and car transfers.   -HK patient did well moving legs to EOB, CGA for L LE  -AS  Verbal cues to move LEs towards EOB and to push up from bed from behind to raise trunk into sitting and to scoot hips out to get feet on floor. Denied dizziness upon sitting up. CGA to L LE to assist back into bed.   -LR    Transfer Assessment/Treatment    Transfers, Sit-Stand [place]  verbal cues required;contact guard assist;2 person assist required  -HK verbal cues required;contact guard assist;2 person assist required  -AS  verbal cues required;contact guard assist;2 person assist required  -LR    Transfers, Stand-Sit [place]  verbal cues required;contact guard assist;2 person assist required  -HK verbal cues required;contact guard assist;2 person assist required  -AS  verbal cues required;minimum assist (75% patient effort);2 person assist required  -LR    Transfers, Sit-Stand-Sit, Assist Device  rolling walker  -HK rolling walker  -AS  rolling walker  -LR    Transfer, Safety Issues     sequencing ability decreased;step length  decreased;weight-shifting ability decreased;knees buckling  -LR    Transfer, Impairments  ROM decreased;pain;strength decreased  -HK ROM decreased;pain;strength decreased  -AS  ROM decreased;strength decreased;pain  -LR    Transfer, Comment  VCs for hand placement and sequencing  -HK verbal cues for hand placement  -AS  Verbal cues to push up from bed to stand instead of pulling up on RW to stand. Verbal cues to reach back for bed to lower into sitting. Patient with B knee buckling with weight shifting, d/t numbness remaining from spinal. Stood again from bed to allow for bed to be changed and to take steps towards HOB.   -LR    Functional Mobility    Functional Mobility- Ind. Level  contact guard assist;verbal cues required  -HK       Functional Mobility- Device  rolling walker  -HK       Functional Mobility-Distance (Feet)  20  -HK       Functional Mobility- Safety Issues  step length decreased;weight-shifting ability decreased  -HK       Functional Mobility- Comment  Pt ambulated from bed and into hallway  -HK       Lower Body Bathing Assessment/Training    LB Bathing Assess/Train, Comment  OT educated pt on use of LH sponge for LB bathing. Pt states she has all AE at home.  -HK       Lower Body Dressing Assessment/Training    LB Dressing Assess/Train, Clothing Type  doffing:;donning:;slipper socks  -HK       LB Dressing Assess/Train, Assist Device  reacher;sock-aid  -HK       LB Dressing Assess/Train, Position  sitting  -HK       LB Dressing Assess/Train, [place]  conditional independence  -HK       LB Dressing Assess/Train, Comment  OT educated pt on completion of LB dressing with use of AE. Pt states she has all AE at home and will not need to purchase.   -HK       Therapy Exercises    Exercise Protocols   total hip  -AS  total hip   anterior  -LR    Total Hip Exercises   left:;10 reps;with assist;ankle pumps/circles;quad set;glut set;heel slides;hip abduction  -AS  left:;10 reps;ankle pumps/circles;quad  set;glut set   cues for technique; independent with LAQ  -LR    Sensory Assessment/Intervention    Sensory Impairment     --   c/o mild numbness/tingling in LEs from spinal;actively DF B  -LR    General Therapy Interventions    Planned Therapy Interventions  adaptive equipment training;ADL retraining;bed mobility training;transfer training  -       Adaptive Equipment Training  OT issued AE and educated pt on use  -HK       ADL Retraining  Pt educated on use of AE for completion of LB dressing.   -HK       Bed Mobility Training  OT issed leg  and educated pt on use of bed mobility.   -HK       Transfer Training  OT issued leg  and educated pt on use for car transfers.   -HK       Positioning and Restraints    Pre-Treatment Position  in bed  -HK in bed  -AS  in bed  -LR    Post Treatment Position  chair  -HK chair  -AS  bed  -LR    In Bed     notified nsg;supine;call light within reach;encouraged to call for assist;with nsg;side rails up x2  -LR    In Chair  notified nsg;reclined;call light within reach;encouraged to call for assist;exit alarm on  -HK reclined;call light within reach;encouraged to call for assist;exit alarm on;with OT  -AS      General LE Assessment    ROM     RLE ROM was WFL;hip, left: LE ROM deficit  -LR    ROM Detail     L hip AROM impaired 25%  -LR    Lower Extremity    Lower Ext Manual Muscle Testing     right LE strength is WFL;left hip strength deficit  -LR    Lower Ext Manual Muscle Testing Detail     L LE functionally 3-/5, buckling from effects of spinal with weight bearing  -LR      12/11/17 1300 12/11/17 0828             General Information    Equipment Currently Used at Home none  -MK        Living Environment    Lives With  spouse  -MY       Living Arrangements  house  -MY       Home Accessibility  no concerns  -MY       Type of Financial/Environmental Concern  none  -MY       Transportation Available  car;family or friend will provide  -MY       Functional Level Prior     Ambulation 0-->independent  -MK 0-->independent  -MY       Transferring 0-->independent  -MK 0-->independent  -MY       Toileting 0-->independent  -MK 0-->independent  -MY       Bathing 0-->independent  -MK 0-->independent  -MY       Dressing 0-->independent  -MK 0-->independent  -MY       Eating 0-->independent  -MK 0-->independent  -MY       Communication 0-->understands/communicates without difficulty  -MK 0-->understands/communicates without difficulty  -MY       Swallowing 0-->swallows foods/liquids without difficulty  -MK 0-->swallows foods/liquids without difficulty  -MY         User Key  (r) = Recorded By, (t) = Taken By, (c) = Cosigned By    Initials Name Effective Dates    LR Sally Barahona, PT 06/19/15 -     AS Lilli Peña, PTA 06/22/15 -     MY Beulah Kendrick, RN 06/16/16 -     SP Hina Sanchez, RN 03/14/16 -     AB Elicia Carrera, RN 06/16/16 -     MK iRa Amezquita, RN 06/16/16 -     HK Nilda Moore, OT 09/28/17 -            Occupational Therapy Education     Title: PT OT SLP Therapies (Active)     Topic: Occupational Therapy (Done)     Point: ADL training (Done)    Description: Instruct learner(s) on proper safety adaptation and remediation techniques during self care or transfers.   Instruct in proper use of assistive devices.    Learning Progress Summary    Learner Readiness Method Response Comment Documented by Status   Patient Acceptance E VU Pt educated on ADL retraining with AE for LB dressing, safety precautions, and appropriate body mechanics.  12/12/17 0907 Done               Point: Precautions (Done)    Description: Instruct learner(s) on prescribed precautions during self-care and functional transfers.    Learning Progress Summary    Learner Readiness Method Response Comment Documented by Status   Patient Acceptance E VU Pt educated on ADL retraining with AE for LB dressing, safety precautions, and appropriate body mechanics.  12/12/17 8920 Done                Point: Body mechanics (Done)    Description: Instruct learner(s) on proper positioning and spine alignment during self-care, functional mobility activities and/or exercises.    Learning Progress Summary    Learner Readiness Method Response Comment Documented by Status   Patient Acceptance E VU Pt educated on ADL retraining with AE for LB dressing, safety precautions, and appropriate body mechanics.  12/12/17 0946 Done                      User Key     Initials Effective Dates Name Provider Type Discipline     09/28/17 -  Nilda Moore OT Occupational Therapist OT                  OT Recommendation and Plan  Anticipated Equipment Needs At Discharge: other (see comments) (TBD)  Anticipated Discharge Disposition: home with assist, home with home health  Planned Therapy Interventions: adaptive equipment training, ADL retraining, bed mobility training, transfer training  Therapy Frequency: daily (per priority policy)  Plan of Care Review  Plan Of Care Reviewed With: patient  Progress: improving  Outcome Summary/Follow up Plan: OT eval complete. Pt met functional goals for LB dressing with conditional independence. Requiring CGA for ambulation and transfers. Recommend discharge home with assist and home health.           OT Goals       12/12/17 0947          Bed Mobility OT LTG    Bed Mobility OT LTG, Date Established 12/12/17  -HK      Bed Mobility OT LTG, Time to Achieve 5 days  -HK      Bed Mobility OT LTG, Activity Type scoot/bridge;supine to sit/sit to supine  -HK      Bed Mobility OT LTG, [place] Level conditional independence  -HK      Bed Mobility OT LTG, Assist Device bed rails;leg   -HK      Bed Mobility OT LTG, Outcome goal ongoing  -HK      Transfer Training OT LTG    Transfer Training OT LTG, Date Established 12/12/17  -HK      Transfer Training OT LTG, Time to Achieve 5 days  -HK      Transfer Training OT LTG, Activity Type sit to stand/stand to sit;toilet  -HK      Transfer Training OT LTG,  [place] Level contact guard assist;verbal cues required  -HK      Transfer Training OT LTG, Assist Device walker, rolling  -HK      Transfer Training OT LTG, Outcome goal ongoing  -HK      Patient Education OT LTG    Patient Education OT LTG, Date Established 12/12/17  -HK      Patient Education OT LTG, Time to Achieve 5 days  -HK      Patient Education OT LTG, Education Type precautions per surgeon;positioning;posture/body mechanics;1 hand/kaiser technique;adaptive equipment mgmt  -HK      Patient Education OT LTG Outcome goal ongoing  -HK      LB Dressing OT LTG    LB Dressing Goal OT LTG, Date Established 12/12/17  -HK      LB Dressing Goal OT LTG, Time to Achieve 5 days  -HK      LB Dressing Goal OT LTG, Activity Type Pt will don/doff socks   -HK      LB Dressing Goal OT LTG, [place] Level conditional independence  -HK      LB Dressing Goal OT LTG, Adaptive Equipment reacher;sock-aid  -HK      LB Dressing Goal OT LTG, Outcome goal met  -HK        User Key  (r) = Recorded By, (t) = Taken By, (c) = Cosigned By    Initials Name Provider Type     Nilda Moore, OT Occupational Therapist                Outcome Measures       12/12/17 0847 12/12/17 0836 12/11/17 1535    How much help from another person do you currently need...    Turning from your back to your side while in flat bed without using bedrails?  3  -AS 3  -LR    Moving from lying on back to sitting on the side of a flat bed without bedrails?  3  -AS 3  -LR    Moving to and from a bed to a chair (including a wheelchair)?  3  -AS 1  -LR    Standing up from a chair using your arms (e.g., wheelchair, bedside chair)?  3  -AS 3  -LR    Climbing 3-5 steps with a railing?  2  -AS 1  -LR    To walk in hospital room?  3  -AS 1  -LR    AM-PAC 6 Clicks Score  17  -AS 12  -LR    How much help from another is currently needed...    Putting on and taking off regular lower body clothing? 4  -HK      Bathing (including washing, rinsing, and drying) 3  -HK       Toileting (which includes using toilet bed pan or urinal) 3  -HK      Putting on and taking off regular upper body clothing 4  -HK      Taking care of personal grooming (such as brushing teeth) 4  -HK      Eating meals 4  -HK      Score 22  -HK      Functional Assessment    Outcome Measure Options AM-PAC 6 Clicks Daily Activity (OT)  -HK AM-PAC 6 Clicks Basic Mobility (PT)  -AS AM-PAC 6 Clicks Basic Mobility (PT)  -LR      User Key  (r) = Recorded By, (t) = Taken By, (c) = Cosigned By    Initials Name Provider Type    LR Sally Barahona, PT Physical Therapist    AS Lilli Peña, PTA Physical Therapy Assistant     Nilda Moore OT Occupational Therapist          Time Calculation:   OT Start Time: 0847    Therapy Charges for Today     Code Description Service Date Service Provider Modifiers Qty    33506623928  OT EVAL MOD COMPLEXITY 2 12/12/2017 Nilda Moore OT GO 1    [id number]  OT THERAPEUTIC ACT EA 15 MIN 12/12/2017 Nilda Moore OT GO 2               Nilda Moore OT  12/12/2017

## 2017-12-12 NOTE — PLAN OF CARE
Problem: Patient Care Overview (Adult)  Goal: Plan of Care Review  Outcome: Ongoing (interventions implemented as appropriate)    12/12/17 1448   Coping/Psychosocial Response Interventions   Plan Of Care Reviewed With patient   Patient Care Overview   Progress improving   Outcome Evaluation   Outcome Summary/Follow up Plan Pt ambulates in [place] for total of 60 ft with RWx, followed with chair and 1 sitting rest break.          Problem: Inpatient Physical Therapy  Goal: Bed Mobility Goal LTG- PT  Outcome: Ongoing (interventions implemented as appropriate)    12/11/17 1535 12/12/17 0912 12/12/17 1448   Bed Mobility PT LTG   Bed Mobility PT LTG, Date Established 12/11/17 --  --    Bed Mobility PT LTG, Time to Achieve 3 days --  --    Bed Mobility PT LTG, Activity Type supine to sit/sit to supine --  --    Bed Mobility PT LTG, [place] Level conditional independence --  --    Bed Mobility PT LTG, Date Goal Reviewed --  12/12/17 --    Bed Mobility PT LTG, Outcome --  --  goal ongoing       Goal: Transfer Training Goal 1 LTG- PT  Outcome: Ongoing (interventions implemented as appropriate)    12/11/17 1535 12/12/17 0912 12/12/17 1448   Transfer Training PT LTG   Transfer Training PT LTG, Date Established 12/11/17 --  --    Transfer Training PT LTG, Time to Achieve 3 days --  --    Transfer Training PT LTG, Activity Type sit to stand/stand to sit --  --    Transfer Training PT LTG, [place] Level conditional independence --  --    Transfer Training PT LTG, Assist Device walker, rolling --  --    Transfer Training PT LTG, Date Goal Reviewed --  12/12/17 --    Transfer Training PT LTG, Outcome --  --  goal ongoing       Goal: Gait Training Goal LTG- PT  Outcome: Ongoing (interventions implemented as appropriate)    12/11/17 1535 12/12/17 0912 12/12/17 1448   Gait Training PT LTG   Gait Training Goal PT LTG, Date Established 12/11/17 --  --    Gait Training Goal PT LTG, Time to Achieve 3 days --  --    Gait Training  Goal PT LTG, [place] Level conditional independence --  --    Gait Training Goal PT LTG, Assist Device walker, rolling --  --    Gait Training Goal PT LTG, Distance to Achieve 500 feet --  --    Gait Training Goal PT LTG, Date Goal Reviewed --  12/12/17 --    Gait Training Goal PT LTG, Outcome --  --  goal ongoing       Goal: Stair Training Goal LTG- PT  Outcome: Ongoing (interventions implemented as appropriate)    12/11/17 1535 12/12/17 0912 12/12/17 1448   Stair Training PT LTG   Stair Training Goal PT LTG, Date Established 12/11/17 --  --    Stair Training Goal PT LTG, Time to Achieve 3 days --  --    Stair Training Goal PT LTG, Number of Steps 1 --  --    Stair Training Goal PT LTG, [place] Level contact guard assist --  --    Stair Training Goal PT LTG, Assist Device walker, rolling;other (see comments)  (backwards) --  --    Stair Training Goal PT LTG, Date Goal Reviewed --  12/12/17 --    Stair Training Goal PT LTG, Outcome --  --  goal ongoing

## 2017-12-12 NOTE — PROGRESS NOTES
Discharge Planning Assessment  [facility]     Patient Name: Dena Guillermo  MRN: [id number]  Today's Date: 12/12/2017    Admit Date: 12/11/2017          Discharge Needs Assessment       12/12/17 0919    Living Environment    Lives With spouse    Living Arrangements house    Home Accessibility no concerns    Stair Railings at Home none    Type of Financial/Environmental Concern none    Transportation Available family or friend will provide    Living Environment    Provides Primary Care For no one    Quality Of Family Relationships supportive;helpful;involved    Able to Return to Prior Living Arrangements yes    Discharge Needs Assessment    Concerns To Be Addressed discharge planning concerns    Readmission Within The Last 30 Days no previous admission in last 30 days    Anticipated Changes Related to Illness inability to care for self    Equipment Currently Used at Home cane, quad;walker, rolling    Equipment Needed After Discharge none    Discharge Disposition other (see comments)    Discharge Contact Information if Applicable Galileo Guillermo [phone]    Discharge Planning Comments [facility]            Discharge Plan       12/12/17 0920    Case Management/Social Work Plan    Plan home with [facility]    Patient/Family In Agreement With Plan yes    Additional Comments Pt lives in [place] with her . She was independent with ADLs prior to admit. Has access to a walker and cane. Pt is followed by her PCP and reports her medications are covered by insurance. Her goal for discharge is to return home with [facility]. CM has spoken with Claudia who has accepted the referral.        Discharge Placement     No information found                Demographic Summary       12/12/17 0917    Referral Information    Admission Type inpatient    Referral Source physician    Reason For Consult discharge planning    Contact Information    Permission Granted to Share Information With ;family/designee    Primary Care  Physician Information    Name Deacon            Functional Status       12/12/17 0919    Functional Status Current    Current Functional Level Comment See PT eval    Functional Status Prior    Ambulation 0-->independent    Transferring 0-->independent    Toileting 0-->independent    Bathing 0-->independent    Dressing 0-->independent    Eating 0-->independent    Communication 0-->understands/communicates without difficulty    Swallowing 0-->swallows foods/liquids without difficulty    IADL    Medications independent    Meal Preparation independent    Housekeeping independent    Laundry independent    Shopping independent    Oral Care independent            Psychosocial     None            Abuse/Neglect     None            Legal     None            Substance Abuse     None            Patient Forms     None          Hina Sanchez RN

## 2017-12-12 NOTE — THERAPY TREATMENT NOTE
Acute Care - Physical Therapy Treatment Note  [facility]     Patient Name: Dena Guillermo  : 1955  MRN: [id number]  Today's Date: 2017  Onset of Illness/Injury or Date of Surgery Date: 17  Date of Referral to PT: 17  Referring Physician: MD Caleb    Admit Date: 2017    Visit Dx:    ICD-10-CM ICD-9-CM   1. Impaired functional mobility, balance, gait, and endurance Z74.09 V49.89     Patient Active Problem List   Diagnosis   • Arthritis of left hip   • Status post total replacement of left hip   • CHAYITO on CPAP   • Hypothyroid   • Anxiety and depression               Adult Rehabilitation Note       17 0836          Rehab Assessment/Intervention    Discipline physical therapy assistant  -AS      Document Type therapy note (daily note)  -AS      Subjective Information agree to therapy;complains of;pain  -AS      Patient Effort, Rehab Treatment good  -AS      Symptoms Noted During/After Treatment fatigue  -AS      Precautions/Limitations fall precautions;anterior hip precautions- left  -AS      Recorded by [AS] Lilli Peña PTA      Pain Assessment    Pain Assessment 0-10  -AS      Pain Score 6  -AS      Post Pain Score 5  -AS      Pain Type Acute pain;Surgical pain  -AS      Pain Location Hip  -AS      Pain Orientation Left  -AS      Pain Intervention(s) Repositioned;Ambulation/increased activity;Medication (See MAR)   pre-medicated  -AS      Response to Interventions tolerated  -AS      Recorded by [AS] Lilli Peña PTA      Cognitive Assessment/Intervention    Current Cognitive/Communication Assessment functional  -AS      Orientation Status oriented x 4  -AS      Follows Commands/Answers Questions 100% of the time;able to follow single-step instructions  -AS      Personal Safety WNL/WFL  -AS      Personal Safety Interventions fall prevention program maintained;gait belt;nonskid shoes/slippers when out of bed;other (see comments)   exirt alarm  -AS      Recorded by  [AS] Lilli Peña PTA      Bed Mobility, Assessment/Treatment    Bed Mob, Supine to Sit, [place] verbal cues required;contact guard assist  -AS      Bed Mob, Sit to Supine, [place] not tested  -AS      Bed Mobility, Impairments pain;ROM decreased  -AS      Bed Mobility, Comment patient did well moving legs to EOB, CGA for L LE  -AS      Recorded by [AS] Lilli Peña PTA      Transfer Assessment/Treatment    Transfers, Sit-Stand [place] verbal cues required;contact guard assist;2 person assist required  -AS      Transfers, Stand-Sit [place] verbal cues required;contact guard assist;2 person assist required  -AS      Transfers, Sit-Stand-Sit, Assist Device rolling walker  -AS      Transfer, Impairments ROM decreased;pain;strength decreased  -AS      Transfer, Comment verbal cues for hand placement  -AS      Recorded by [AS] Lilli Peña PTA      Gait Assessment/Treatment    Gait, [place] Level verbal cues required;contact guard assist;2 person assist required  -AS      Gait, Assistive Device rolling walker  -AS      Gait, Distance (Feet) 20  -AS      Gait, Gait Deviations left:;decreased heel strike;step length decreased  -AS      Gait, Safety Issues sequencing ability decreased;step length decreased;weight-shifting ability decreased  -AS      Gait, Impairments ROM decreased;strength decreased;pain  -AS      Gait, Comment patient ambulated to hallway with step to gait pattern due to pain. Followed with chair for safety.  -AS      Recorded by [AS] Lilli Peña PTA      Therapy Exercises    Exercise Protocols total hip  -AS      Total Hip Exercises left:;10 reps;with assist;ankle pumps/circles;quad set;glut set;heel slides;hip abduction  -AS      Recorded by [AS] Lilli Peña PTA      Positioning and Restraints    Pre-Treatment Position in bed  -AS      Post Treatment Position chair  -AS      In Chair reclined;call light within reach;encouraged to call for  assist;exit alarm on;with OT  -AS      Recorded by [AS] Lilli Peña PTA        User Key  (r) = Recorded By, (t) = Taken By, (c) = Cosigned By    Initials Name Effective Dates    AS Lilli Peña PTA 06/22/15 -                 IP PT Goals       12/12/17 0912 12/11/17 1535       Bed Mobility PT LTG    Bed Mobility PT LTG, Date Established  12/11/17  -LR     Bed Mobility PT LTG, Time to Achieve  3 days  -LR     Bed Mobility PT LTG, Activity Type  supine to sit/sit to supine  -LR     Bed Mobility PT LTG, [place] Level  conditional independence  -LR     Bed Mobility PT LTG, Date Goal Reviewed 12/12/17  -AS 12/11/17  -LR     Bed Mobility PT LTG, Outcome goal ongoing  -AS goal ongoing  -LR     Transfer Training PT LTG    Transfer Training PT LTG, Date Established  12/11/17  -LR     Transfer Training PT LTG, Time to Achieve  3 days  -LR     Transfer Training PT LTG, Activity Type  sit to stand/stand to sit  -LR     Transfer Training PT LTG, [place] Level  conditional independence  -LR     Transfer Training PT LTG, Assist Device  walker, rolling  -LR     Transfer Training PT  LTG, Date Goal Reviewed 12/12/17  -AS 12/11/17  -LR     Transfer Training PT LTG, Outcome goal ongoing  -AS goal ongoing  -LR     Gait Training PT LTG    Gait Training Goal PT LTG, Date Established  12/11/17  -LR     Gait Training Goal PT LTG, Time to Achieve  3 days  -LR     Gait Training Goal PT LTG, [place] Level  conditional independence  -LR     Gait Training Goal PT LTG, Assist Device  walker, rolling  -LR     Gait Training Goal PT LTG, Distance to Achieve  500 feet  -LR     Gait Training Goal PT LTG, Date Goal Reviewed 12/12/17  -AS 12/11/17  -LR     Gait Training Goal PT LTG, Outcome goal ongoing  -AS goal ongoing  -LR     Stair Training PT LTG    Stair Training Goal PT LTG, Date Established  12/11/17  -LR     Stair Training Goal PT LTG, Time to Achieve  3 days  -LR     Stair Training Goal PT LTG, Number of Steps  1   -LR     Stair Training Goal PT LTG, [place] Level  contact guard assist  -LR     Stair Training Goal PT LTG, Assist Device  walker, rolling;other (see comments)   backwards  -LR     Stair Training Goal PT LTG, Date Goal Reviewed 12/12/17  -AS 12/11/17  -LR     Stair Training Goal PT LTG, Outcome goal ongoing  -AS goal ongoing  -LR       User Key  (r) = Recorded By, (t) = Taken By, (c) = Cosigned By    Initials Name Provider Type    LR Sally Barahona, PT Physical Therapist    AS Lilli Peña, PTA Physical Therapy Assistant          Physical Therapy Education     Title: PT OT SLP Therapies (Active)     Topic: Physical Therapy (Active)     Point: Mobility training (Active)    Learning Progress Summary    Learner Readiness Method Response Comment Documented by Status   Patient Acceptance E NR  AS 12/12/17 0912 Active    Acceptance D,E VU,NR Educated on HEP, hip precautions, and weight bearing status. LR 12/11/17 1625 Done               Point: Home exercise program (Active)    Learning Progress Summary    Learner Readiness Method Response Comment Documented by Status   Patient Acceptance E NR  AS 12/12/17 0912 Active    Acceptance D,E VU,NR Educated on HEP, hip precautions, and weight bearing status. LR 12/11/17 1625 Done               Point: Body mechanics (Active)    Learning Progress Summary    Learner Readiness Method Response Comment Documented by Status   Patient Acceptance E NR  AS 12/12/17 0912 Active    Acceptance D,E VU,NR Educated on HEP, hip precautions, and weight bearing status. LR 12/11/17 1625 Done               Point: Precautions (Active)    Learning Progress Summary    Learner Readiness Method Response Comment Documented by Status   Patient Acceptance E NR  AS 12/12/17 0912 Active    Acceptance D,E VU,NR Educated on HEP, hip precautions, and weight bearing status. LR 12/11/17 1625 Done                      User Key     Initials Effective Dates Name Provider Type Discipline    LR  06/19/15 -  Sally Barahona, PT Physical Therapist PT    AS 06/22/15 -  Lilli Peña PTA Physical Therapy Assistant PT                    PT Recommendation and Plan  Anticipated Equipment Needs At Discharge:  (none)  Anticipated Discharge Disposition: home with assist, home with home health  Planned Therapy Interventions: balance training, bed mobility training, gait training, home exercise program, patient/family education, ROM (Range of Motion), stair training, strengthening, transfer training  PT Frequency: 2 times/day  Plan of Care Review  Plan Of Care Reviewed With: patient  Progress: improving  Outcome Summary/Follow up Plan: patient able to increased gait distance with use of rolling walker for support, patient with step to gait due to pain unbale to progress with step through. Follow with chair for safety.          Outcome Measures       12/12/17 0836 12/11/17 1535       How much help from another person do you currently need...    Turning from your back to your side while in flat bed without using bedrails? 3  -AS 3  -LR     Moving from lying on back to sitting on the side of a flat bed without bedrails? 3  -AS 3  -LR     Moving to and from a bed to a chair (including a wheelchair)? 3  -AS 1  -LR     Standing up from a chair using your arms (e.g., wheelchair, bedside chair)? 3  -AS 3  -LR     Climbing 3-5 steps with a railing? 2  -AS 1  -LR     To walk in hospital room? 3  -AS 1  -LR     AM-PAC 6 Clicks Score 17  -AS 12  -LR     Functional Assessment    Outcome Measure Options AM-PAC 6 Clicks Basic Mobility (PT)  -AS AM-PAC 6 Clicks Basic Mobility (PT)  -LR       User Key  (r) = Recorded By, (t) = Taken By, (c) = Cosigned By    Initials Name Provider Type    LR Sally Braahona, PT Physical Therapist    AS Lilli Peña, EDINSON Physical Therapy Assistant           Time Calculation:         PT Charges       12/12/17 0913          Time Calculation    Start Time 0836  -AS      PT Received  On 12/12/17  -AS      PT Goal Re-Cert Due Date 12/21/17  -AS      Time Calculation- PT    Total Timed Code Minutes- PT 23 minute(s)  -AS        User Key  (r) = Recorded By, (t) = Taken By, (c) = Cosigned By    Initials Name Provider Type    AS Lilli Peña PTA Physical Therapy Assistant          Therapy Charges for Today     Code Description Service Date Service Provider Modifiers Qty    46394094621 HC GAIT TRAINING EA 15 MIN 12/12/2017 Lilli Peña PTA GP 1    13202971414 HC PT THER PROC EA 15 MIN 12/12/2017 Lilli Peña PTA GP 1          PT G-Codes  Outcome Measure Options: AM-PAC 6 Clicks Basic Mobility (PT)    Lilli Peña PTA  12/12/2017

## 2017-12-12 NOTE — PROGRESS NOTES
"Dena Guillermo  [id number]  1955    /67 (BP Location: Left arm, Patient Position: Lying)  Pulse 79  Temp 98.2 °F (36.8 °C) (Oral)   Resp 17  Ht 170.2 cm (67\")  Wt 106 kg (233 lb 0.4 oz)  SpO2 93%  BMI 36.5 kg/m2    Lab Results (last 24 hours)     Procedure Component Value Units Date/Time    Basic Metabolic Panel [[id number]]  (Abnormal) Collected:  12/12/17 0558    Specimen:  Blood Updated:  12/12/17 0639     Glucose 114 (H) mg/dL      BUN 16 mg/dL      Creatinine 0.90 mg/dL      Sodium 139 mmol/L      Potassium 3.8 mmol/L      Chloride 107 mmol/L      CO2 26.0 mmol/L      Calcium 8.0 (L) mg/dL      eGFR Non African Amer 63 mL/min/1.73      BUN/Creatinine Ratio 17.8     Anion Gap 6.0 mmol/L     Narrative:       [employer] Guidelines    Stage     Description        GFR  1         Normal or High     90+  2         Mild decrease      60-89  3         Moderate decrease  30-59  4         Severe decrease    15-29  5         Kidney failure     <15    CBC & Differential [[id number]] Collected:  12/12/17 0558    Specimen:  Blood Updated:  12/12/17 0752    Narrative:       The following orders were created for panel order CBC & Differential.  Procedure                               Abnormality         Status                     ---------                               -----------         ------                     CBC Auto Differential[[id number]]        Abnormal            Final result                 Please view results for these tests on the individual orders.    CBC Auto Differential [[id number]]  (Abnormal) Collected:  12/12/17 0558    Specimen:  Blood Updated:  12/12/17 0752     WBC 7.98 10*3/mm3      RBC 3.55 (L) 10*6/mm3      Hemoglobin 10.6 (L) g/dL      Hematocrit 33.3 (L) %      MCV 93.8 fL      MCH 29.9 pg      MCHC 31.8 (L) g/dL      RDW 14.0 %      RDW-SD 48.7 fl      MPV 9.6 fL      Platelets 241 10*3/mm3      Neutrophil % 74.6 (H) %      Lymphocyte % 15.2 (L) %      Monocyte % " 9.8 %      Eosinophil % 0.0 %      Basophil % 0.1 %      Immature Grans % 0.3 %      Neutrophils, Absolute 5.96 10*3/mm3      Lymphocytes, Absolute 1.21 10*3/mm3      Monocytes, Absolute 0.78 10*3/mm3      Eosinophils, Absolute 0.00 10*3/mm3      Basophils, Absolute 0.01 10*3/mm3      Immature Grans, Absolute 0.02 10*3/mm3           Patient Care Team:  Elisa Landry MD as PCP - General (Internal Medicine)    SUBJECTIVE  Pain well controlled.  Good start in physical therapy.    PHYSICAL EXAM  Incision clean and dry  Minimal thigh and leg swelling  Neurovascularly intact to knee and toes     Principal Problem:    Status post total replacement of left hip  Active Problems:    Arthritis of left hip    CHAYITO on CPAP    Hypothyroid    Anxiety and depression      PLAN / DISPOSITION:  Hemoglobin 10.6 - no transfusion anticipated  Discharge home today/tomorrow depending on progress with pain control and PT    Everett Ellis MD  12/12/17  8:36 AM

## 2017-12-13 VITALS
TEMPERATURE: 99.1 F | WEIGHT: 233.03 LBS | OXYGEN SATURATION: 95 % | RESPIRATION RATE: 17 BRPM | HEIGHT: 67 IN | DIASTOLIC BLOOD PRESSURE: 62 MMHG | BODY MASS INDEX: 36.57 KG/M2 | SYSTOLIC BLOOD PRESSURE: 131 MMHG | HEART RATE: 87 BPM

## 2017-12-13 PROCEDURE — 97116 GAIT TRAINING THERAPY: CPT

## 2017-12-13 PROCEDURE — 97110 THERAPEUTIC EXERCISES: CPT

## 2017-12-13 RX ORDER — IBUPROFEN 800 MG/1
800 TABLET ORAL EVERY 8 HOURS PRN
Start: 2017-12-13

## 2017-12-13 RX ORDER — PSEUDOEPHEDRINE HCL 30 MG
1 TABLET ORAL 2 TIMES DAILY
Qty: 60 CAPSULE | Refills: 0 | Status: SHIPPED | OUTPATIENT
Start: 2017-12-13

## 2017-12-13 RX ORDER — OXYCODONE HYDROCHLORIDE AND ACETAMINOPHEN 5; 325 MG/1; MG/1
1 TABLET ORAL EVERY 4 HOURS PRN
Qty: 40 TABLET | Refills: 0 | Status: SHIPPED | OUTPATIENT
Start: 2017-12-13 | End: 2017-12-21

## 2017-12-13 RX ORDER — ASPIRIN 81 MG/1
81 TABLET ORAL DAILY
Start: 2017-12-13

## 2017-12-13 RX ADMIN — ASPIRIN 325 MG: 325 TABLET, DELAYED RELEASE ORAL at 08:17

## 2017-12-13 RX ADMIN — DOCUSATE SODIUM 100 MG: 100 CAPSULE, LIQUID FILLED ORAL at 08:17

## 2017-12-13 RX ADMIN — Medication 2 TABLET: at 08:17

## 2017-12-13 RX ADMIN — OXYCODONE AND ACETAMINOPHEN 1 TABLET: 5; 325 TABLET ORAL at 05:51

## 2017-12-13 RX ADMIN — ESCITALOPRAM OXALATE 20 MG: 20 TABLET ORAL at 08:17

## 2017-12-13 RX ADMIN — OXYCODONE AND ACETAMINOPHEN 1 TABLET: 5; 325 TABLET ORAL at 10:29

## 2017-12-13 RX ADMIN — Medication 1 CAPSULE: at 08:17

## 2017-12-13 RX ADMIN — TRAMADOL HYDROCHLORIDE 50 MG: 50 TABLET, COATED ORAL at 02:32

## 2017-12-13 RX ADMIN — TRAMADOL HYDROCHLORIDE 50 MG: 50 TABLET, COATED ORAL at 13:23

## 2017-12-13 RX ADMIN — LEVOTHYROXINE SODIUM 100 MCG: 100 TABLET ORAL at 08:17

## 2017-12-13 NOTE — PROGRESS NOTES
"Dena Guillermo  [id number]  1955    /62 (BP Location: Left arm, Patient Position: Lying)  Pulse 87  Temp 99.1 °F (37.3 °C) (Oral)   Resp 17  Ht 170.2 cm (67\")  Wt 106 kg (233 lb 0.4 oz)  SpO2 95%  BMI 36.5 kg/m2    Lab Results (last 24 hours)     ** No results found for the last 24 hours. **          Patient Care Team:  Elisa Landry MD as PCP - General (Internal Medicine)    SUBJECTIVE  Pain well controlled.  Good start in physical therapy.  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 Wants tramadol for back up (can be phoned in from office)  Discharge home today    Everett Ellis MD  12/13/17  10:11 AM  "

## 2017-12-13 NOTE — THERAPY TREATMENT NOTE
Acute Care - Physical Therapy Treatment Note  [facility]     Patient Name: Dena Guillermo  : 1955  MRN: [id number]  Today's Date: 2017  Onset of Illness/Injury or Date of Surgery Date: 17  Date of Referral to PT: 17  Referring Physician: MD Caleb    Admit Date: 2017    Visit Dx:    ICD-10-CM ICD-9-CM   1. Impaired functional mobility, balance, gait, and endurance Z74.09 V49.89   2. Impaired mobility and ADLs Z74.09 799.89     Patient Active Problem List   Diagnosis   • Arthritis of left hip   • Status post total replacement of left hip   • CHAYITO on CPAP   • Hypothyroid   • Anxiety and depression               Adult Rehabilitation Note       17 0906 17 1405 17 0836    Rehab Assessment/Intervention    Discipline physical therapist  - physical therapist  - physical therapy assistant  -AS    Document Type therapy note (daily note)  - therapy note (daily note)  - therapy note (daily note)  -AS    Subjective Information agree to therapy;no complaints  - agree to therapy;complains of;pain  - agree to therapy;complains of;pain  -AS    Patient Effort, Rehab Treatment good  - good  - good  -AS    Symptoms Noted During/After Treatment fatigue  - fatigue  - fatigue  -AS    Precautions/Limitations fall precautions;anterior hip precautions- left  -EH fall precautions;anterior hip precautions- left  -EH fall precautions;anterior hip precautions- left  -AS    Recorded by [EH] Elva Spicer, PT [EH] Elva Spicer, PT [AS] Lilli Peña PTA    Pain Assessment    Pain Assessment 0-10  -EH 0-10  -EH 0-10  -AS    Pain Score 6  -EH 6  -EH 6  -AS    Post Pain Score 7  -EH 7  -EH 5  -AS    Pain Type Acute pain;Surgical pain  -EH Acute pain;Surgical pain  -EH Acute pain;Surgical pain  -AS    Pain Location Hip  -EH Hip  -EH Hip  -AS    Pain Orientation Left  -EH Left  -EH Left  -AS    Pain Descriptors Burning  -EH Burning  -EH     Pain Intervention(s)  Repositioned;Ambulation/increased activity  -EH Repositioned;Ambulation/increased activity;Medication (See MAR)   medicated idrectly after PT.  -EH Repositioned;Ambulation/increased activity;Medication (See MAR)   pre-medicated  -AS    Response to Interventions Pt tolerated  -EH Pt tolerated  -EH tolerated  -AS    Recorded by [] Elva Spicer, PT [EH] Elva Spicer, PT [AS] Lilli Peña, PTA    Vision Assessment/Intervention    Visual Impairment WFL  -EH WFL  -EH     Recorded by [EH] Elva Spicer, PT [EH] Elva Spicer, PT     Cognitive Assessment/Intervention    Current Cognitive/Communication Assessment functional  -EH functional  -EH functional  -AS    Orientation Status oriented x 4  -EH oriented x 4  -EH oriented x 4  -AS    Follows Commands/Answers Questions 100% of the time;able to follow single-step instructions  - 100% of the time;able to follow single-step instructions  - 100% of the time;able to follow single-step instructions  -AS    Personal Safety WNL/WFL  -EH WNL/WFL  -EH WNL/WFL  -AS    Personal Safety Interventions fall prevention program maintained  - fall prevention program maintained;gait belt;nonskid shoes/slippers when out of bed  - fall prevention program maintained;gait belt;nonskid shoes/slippers when out of bed;other (see comments)   exirt alarm  -AS    Recorded by [] Elva Spicer, PT [EH] Elva Spicer, PT [AS] Lilli Peña, PTA    Bed Mobility, Assessment/Treatment    Bed Mobility, Assistive Device head of bed elevated;leg   - leg ;head of bed elevated  -EH     Bed Mob, Supine to Sit, [place] conditional independence  -EH verbal cues required;contact guard assist;other (see comments)   increased time  -EH verbal cues required;contact guard assist  -AS    Bed Mob, Sit to Supine, [place]  verbal cues required;contact guard assist   bed flat  - not tested  -AS    Bed Mobility, Impairments  pain;ROM decreased   -EH pain;ROM decreased  -AS    Bed Mobility, Comment  VC for sequencing with use of leg .   - patient did well moving legs to EOB, CGA for L LE  -AS    Recorded by [] Elva Spicer, PT [EH] Elva Spicer, PT [AS] Lilli Peña, PTA    Transfer Assessment/Treatment    Transfers, Sit-Stand [place] stand by assist  -EH verbal cues required;contact guard assist;2 person assist required  -EH verbal cues required;contact guard assist;2 person assist required  -AS    Transfers, Stand-Sit [place] stand by assist  -EH verbal cues required;contact guard assist;2 person assist required  -EH verbal cues required;contact guard assist;2 person assist required  -AS    Transfers, Sit-Stand-Sit, Assist Device rolling walker  -EH rolling walker  -EH rolling walker  -AS    Toilet Transfer, [place] contact guard assist  -      Toilet Transfer, Assistive Device rolling walker  -EH      Transfer, Impairments ROM decreased;pain;strength decreased  -EH ROM decreased;pain;strength decreased  -EH ROM decreased;pain;strength decreased  -AS    Transfer, Comment  VC for HP and sequencing  - verbal cues for hand placement  -AS    Recorded by [] Elva Spicer, PT [EH] Elva Spicer, PT [AS] Lilli Peña, PTA    Gait Assessment/Treatment    Gait, [place] Level contact guard assist  - verbal cues required;contact guard assist;2 person assist required  - verbal cues required;contact guard assist;2 person assist required  -AS    Gait, Assistive Device rolling walker   followed with chair; sitting rest augustin and 2ft in second [place]  - rolling walker  - rolling walker  -AS    Gait, Distance (Feet) 242  -EH 60  -EH 20  -AS    Gait, Gait Pattern Analysis swing-through gait  -      Gait, Gait Deviations other (see comments);forward flexed posture;mariaelena decreased   BLE in slight internal rotation L>R.   - left:;decreased heel strike;right:;step length decreased  -  left:;decreased heel strike;step length decreased  -AS    Gait, Safety Issues   sequencing ability decreased;step length decreased;weight-shifting ability decreased  -AS    Gait, Impairments ROM decreased;strength decreased  -EH ROM decreased;strength decreased;pain  -EH ROM decreased;strength decreased;pain  -AS    Gait, Comment Pt ambulates in shoes from home this date with reported improved pain. Pt mariaelena and step length increased on BLEs with cueing to increase RLE step length. Cueing for upright posture with improvement.  - Pt ambulates with step to gait pattern with internal rotation BLEs. Pt improves toward step through gait pattern but continues to have shortened step length on RLE. VC given for increased step length and upright posture.  - patient ambulated to hallway with step to gait pattern due to pain. Followed with chair for safety.  -AS    Recorded by [] Elva Spicer, PT [EH] Elva Spicer, PT [AS] Lilli Peña, EDINSON    Stairs Assessment/Treatment    Number of Stairs 1  -      Stairs, Handrail Location none  -      Stairs, [place] Level contact guard assist  -      Stairs, Assistive Device walker  -      Stairs, Comment no LOB; pt leads up with surgical leg. Pt was educated on using RLE if she has difficulty at home.  -EH      Recorded by [] Elva Spicer, PT      Therapy Exercises    Exercise Protocols total hip  -EH total hip  -EH total hip  -AS    Total Hip Exercises left:;10 reps;ankle pumps/circles;quad set;glut set;heel slides;SLR;LAQ;hip abduction   leg  used during HS, Abd, SLR  - left:;10 reps;with assist;ankle pumps/circles;quad set;glut set;heel slides;hip abduction   pt uses leg  during ther ex.  - left:;10 reps;with assist;ankle pumps/circles;quad set;glut set;heel slides;hip abduction  -AS    Recorded by [] Elva Spicer, PT [] Elva Spicer, PT [AS] Lilil Peña, EDINSON    Sensory Assessment/Intervention     Light Touch --  -EH      Recorded by [] Elva Spicer, PT      Positioning and Restraints    Pre-Treatment Position in bed  -EH in bed  -EH in bed  -AS    Post Treatment Position chair  -EH chair  -EH chair  -AS    In Chair notified nsg;reclined;call light within reach;encouraged to call for assist;exit alarm on  -EH notified nsg;reclined;call light within reach;encouraged to call for assist;exit alarm on  -EH reclined;call light within reach;encouraged to call for assist;exit alarm on;with OT  -AS    Recorded by [EH] Elva Spicer, PT [EH] Elva Spicer, PT [AS] Lilli Peña, PTA      User Key  (r) = Recorded By, (t) = Taken By, (c) = Cosigned By    Initials Name Effective Dates     Elva Spicer, PT 06/19/15 -     AS Lilli Peañ, PTA 06/22/15 -                 IP PT Goals       12/13/17 1035 12/12/17 1448 12/12/17 0912    Bed Mobility PT LTG    Bed Mobility PT LTG, Date Goal Reviewed   12/12/17  -AS    Bed Mobility PT LTG, Outcome goal met  - goal ongoing  - goal ongoing  -AS    Transfer Training PT LTG    Transfer Training PT  LTG, Date Goal Reviewed   12/12/17  -AS    Transfer Training PT LTG, Outcome goal met  - goal ongoing  - goal ongoing  -AS    Gait Training PT LTG    Gait Training Goal PT LTG, Date Goal Reviewed   12/12/17  -AS    Gait Training Goal PT LTG, Outcome  goal ongoing  - goal ongoing  -AS    Stair Training PT LTG    Stair Training Goal PT LTG, Date Goal Reviewed   12/12/17  -AS    Stair Training Goal PT LTG, Outcome goal met  - goal ongoing  - goal ongoing  -AS      12/11/17 1535          Bed Mobility PT LTG    Bed Mobility PT LTG, Date Established 12/11/17  -LR      Bed Mobility PT LTG, Time to Achieve 3 days  -LR      Bed Mobility PT LTG, Activity Type supine to sit/sit to supine  -LR      Bed Mobility PT LTG, [place] Level conditional independence  -LR      Bed Mobility PT LTG, Date Goal Reviewed 12/11/17  -LR      Bed Mobility PT LTG,  Outcome goal ongoing  -LR      Transfer Training PT LTG    Transfer Training PT LTG, Date Established 12/11/17  -LR      Transfer Training PT LTG, Time to Achieve 3 days  -LR      Transfer Training PT LTG, Activity Type sit to stand/stand to sit  -LR      Transfer Training PT LTG, [place] Level conditional independence  -LR      Transfer Training PT LTG, Assist Device walker, rolling  -LR      Transfer Training PT  LTG, Date Goal Reviewed 12/11/17  -LR      Transfer Training PT LTG, Outcome goal ongoing  -LR      Gait Training PT LTG    Gait Training Goal PT LTG, Date Established 12/11/17  -LR      Gait Training Goal PT LTG, Time to Achieve 3 days  -LR      Gait Training Goal PT LTG, [place] Level conditional independence  -LR      Gait Training Goal PT LTG, Assist Device walker, rolling  -LR      Gait Training Goal PT LTG, Distance to Achieve 500 feet  -LR      Gait Training Goal PT LTG, Date Goal Reviewed 12/11/17  -LR      Gait Training Goal PT LTG, Outcome goal ongoing  -LR      Stair Training PT LTG    Stair Training Goal PT LTG, Date Established 12/11/17  -LR      Stair Training Goal PT LTG, Time to Achieve 3 days  -LR      Stair Training Goal PT LTG, Number of Steps 1  -LR      Stair Training Goal PT LTG, [place] Level contact guard assist  -LR      Stair Training Goal PT LTG, Assist Device walker, rolling;other (see comments)   backwards  -LR      Stair Training Goal PT LTG, Date Goal Reviewed 12/11/17  -LR      Stair Training Goal PT LTG, Outcome goal ongoing  -LR        User Key  (r) = Recorded By, (t) = Taken By, (c) = Cosigned By    Initials Name Provider Type     Elva Spicer, PT Physical Therapist    LR Sally Barahona, PT Physical Therapist    AS Lilli Peña, PTA Physical Therapy Assistant          Physical Therapy Education     Title: PT OT SLP Therapies (Done)     Topic: Physical Therapy (Done)     Point: Mobility training (Done)    Learning Progress Summary     Learner Readiness Method Response Comment Documented by Status   Patient Acceptance E VU,NR   12/13/17 1034 Done    Acceptance E VU,NR   12/12/17 1448 Done    Acceptance E NR  AS 12/12/17 0912 Active    Acceptance D,E VU,NR Educated on HEP, hip precautions, and weight bearing status. LR 12/11/17 1625 Done               Point: Home exercise program (Done)    Learning Progress Summary    Learner Readiness Method Response Comment Documented by Status   Patient Acceptance E VU,NR   12/13/17 1034 Done    Acceptance E VU,NR   12/12/17 1448 Done    Acceptance E NR  AS 12/12/17 0912 Active    Acceptance D,E VU,NR Educated on HEP, hip precautions, and weight bearing status. LR 12/11/17 1625 Done               Point: Body mechanics (Done)    Learning Progress Summary    Learner Readiness Method Response Comment Documented by Status   Patient Acceptance E VU,NR   12/13/17 1034 Done    Acceptance E VU,NR   12/12/17 1448 Done    Acceptance E NR  AS 12/12/17 0912 Active    Acceptance D,E VU,NR Educated on HEP, hip precautions, and weight bearing status. LR 12/11/17 1625 Done               Point: Precautions (Done)    Learning Progress Summary    Learner Readiness Method Response Comment Documented by Status   Patient Acceptance E VU,NR   12/13/17 1034 Done    Acceptance E VU,NR   12/12/17 1448 Done    Acceptance E NR  AS 12/12/17 0912 Active    Acceptance D,E VU,NR Educated on HEP, hip precautions, and weight bearing status. LR 12/11/17 1625 Done                      User Key     Initials Effective Dates Name Provider Type Discipline     06/19/15 -  Elva Spicer, PT Physical Therapist PT     06/19/15 -  Sally Barahona, PT Physical Therapist PT    AS 06/22/15 -  Lilli Peña, EDINSON Physical Therapy Assistant PT                    PT Recommendation and Plan  Anticipated Equipment Needs At Discharge:  (none)  Anticipated Discharge Disposition: home with assist, home with home health  Planned  Therapy Interventions: balance training, bed mobility training, gait training, home exercise program, patient/family education, ROM (Range of Motion), stair training, strengthening, transfer training  PT Frequency: 2 times/day  Plan of Care Review  Plan Of Care Reviewed With: patient  Progress: no change  Outcome Summary/Follow up Plan: Pt ambulates 242 ft in [place] with RWx and CGA with progression to BSC. Pt meets stair, t/f, and bed mobility goal. Pt expected to d/c home this date and reports confidence in mobility.          Outcome Measures       12/13/17 0906 12/12/17 1405 12/12/17 0847    How much help from another person do you currently need...    Turning from your back to your side while in flat bed without using bedrails? 4  -EH 3  -EH     Moving from lying on back to sitting on the side of a flat bed without bedrails? 3  -EH 3  -EH     Moving to and from a bed to a chair (including a wheelchair)? 4  -EH 3  -EH     Standing up from a chair using your arms (e.g., wheelchair, bedside chair)? 4  -EH 3  -EH     Climbing 3-5 steps with a railing? 3  -EH 2  -EH     To walk in hospital room? 3  -EH 3  -EH     AM-PAC 6 Clicks Score 21  -EH 17  -EH     How much help from another is currently needed...    Putting on and taking off regular lower body clothing?   4  -HK    Bathing (including washing, rinsing, and drying)   3  -HK    Toileting (which includes using toilet bed pan or urinal)   3  -HK    Putting on and taking off regular upper body clothing   4  -HK    Taking care of personal grooming (such as brushing teeth)   4  -HK    Eating meals   4  -HK    Score   22  -HK    Functional Assessment    Outcome Measure Options AM-PAC 6 Clicks Basic Mobility (PT)  - AM-PAC 6 Clicks Basic Mobility (PT)  - AM-PAC 6 Clicks Daily Activity (OT)  -HK      12/12/17 0836 12/11/17 1535       How much help from another person do you currently need...    Turning from your back to your side while in flat bed without using  bedrails? 3  -AS 3  -LR     Moving from lying on back to sitting on the side of a flat bed without bedrails? 3  -AS 3  -LR     Moving to and from a bed to a chair (including a wheelchair)? 3  -AS 1  -LR     Standing up from a chair using your arms (e.g., wheelchair, bedside chair)? 3  -AS 3  -LR     Climbing 3-5 steps with a railing? 2  -AS 1  -LR     To walk in hospital room? 3  -AS 1  -LR     AM-PAC 6 Clicks Score 17  -AS 12  -LR     Functional Assessment    Outcome Measure Options AM-PAC 6 Clicks Basic Mobility (PT)  -AS AM-PAC 6 Clicks Basic Mobility (PT)  -LR       User Key  (r) = Recorded By, (t) = Taken By, (c) = Cosigned By    Initials Name Provider Type     Elva Spicer, PT Physical Therapist    LR Sally Barahona, PT Physical Therapist    AS Lilli Peña, PTA Physical Therapy Assistant    HK Nilda Moore, OT Occupational Therapist           Time Calculation:         PT Charges       12/13/17 [facility]          Time Calculation    Start Time 0906  -      PT Received On 12/13/17  -      PT Goal Re-Cert Due Date 12/21/17  -      Time Calculation- PT    Total Timed Code Minutes- PT 23 minute(s)  -        User Key  (r) = Recorded By, (t) = Taken By, (c) = Cosigned By    Initials Name Provider Type     Elva Spicer, PT Physical Therapist          Therapy Charges for Today     Code Description Service Date Service Provider Modifiers Qty    71056106972 HC GAIT TRAINING EA 15 MIN 12/12/2017 Elva Spicer, PT GP 1    51317252634 HC PT THER PROC EA 15 MIN 12/12/2017 Elva Spicer, PT GP 1    54545983058 HC PT THER SUPP EA 15 MIN 12/12/2017 Elva Spicer, PT GP 2    40711705822 HC GAIT TRAINING EA 15 MIN 12/13/2017 Elva Spicer, PT GP 1    02362715936 HC PT THER PROC EA 15 MIN 12/13/2017 Elva Spicer, PT GP 1    90323902254 HC PT THER SUPP EA 15 MIN 12/13/2017 Elva Spicer, PT GP 2          PT G-Codes  Outcome Measure Options: AM-PAC 6 Clicks Basic  Mobility (PT)    Elva Spicer, PT  12/13/2017

## 2017-12-13 NOTE — PLAN OF CARE
Problem: Patient Care Overview (Adult)  Goal: Plan of Care Review  Outcome: Ongoing (interventions implemented as appropriate)    12/13/17 1035   Coping/Psychosocial Response Interventions   Plan Of Care Reviewed With patient   Patient Care Overview   Progress no change   Outcome Evaluation   Outcome Summary/Follow up Plan Pt ambulates 242 ft in [place] with RWx and CGA with progression to BSC. Pt meets stair, t/f, and bed mobility goal. Pt expected to d/c home this date and reports confidence in mobility.         Problem: Inpatient Physical Therapy  Goal: Bed Mobility Goal LTG- PT  Outcome: Outcome(s) achieved Date Met:  12/13/17 12/11/17 1535 12/12/17 0912 12/13/17 1035   Bed Mobility PT LTG   Bed Mobility PT LTG, Date Established 12/11/17 --  --    Bed Mobility PT LTG, Time to Achieve 3 days --  --    Bed Mobility PT LTG, Activity Type supine to sit/sit to supine --  --    Bed Mobility PT LTG, [place] Level conditional independence --  --    Bed Mobility PT LTG, Date Goal Reviewed --  12/12/17 --    Bed Mobility PT LTG, Outcome --  --  goal met       Goal: Transfer Training Goal 1 LTG- PT  Outcome: Outcome(s) achieved Date Met:  12/13/17 12/11/17 1535 12/12/17 0912 12/13/17 1035   Transfer Training PT LTG   Transfer Training PT LTG, Date Established 12/11/17 --  --    Transfer Training PT LTG, Time to Achieve 3 days --  --    Transfer Training PT LTG, Activity Type sit to stand/stand to sit --  --    Transfer Training PT LTG, [place] Level conditional independence --  --    Transfer Training PT LTG, Assist Device walker, rolling --  --    Transfer Training PT LTG, Date Goal Reviewed --  12/12/17 --    Transfer Training PT LTG, Outcome --  --  goal met       Goal: Gait Training Goal LTG- PT  Outcome: Unable to achieve outcome(s) by discharge Date Met:  12/13/17 12/11/17 1535 12/12/17 0912 12/12/17 1448   Gait Training PT LTG   Gait Training Goal PT LTG, Date Established 12/11/17 --  --    Gait  Training Goal PT LTG, Time to Achieve 3 days --  --    Gait Training Goal PT LTG, [place] Level conditional independence --  --    Gait Training Goal PT LTG, Assist Device walker, rolling --  --    Gait Training Goal PT LTG, Distance to Achieve 500 feet --  --    Gait Training Goal PT LTG, Date Goal Reviewed --  12/12/17 --    Gait Training Goal PT LTG, Outcome --  --  goal ongoing       Goal: Stair Training Goal LTG- PT  Outcome: Outcome(s) achieved Date Met:  12/13/17 12/11/17 1535 12/12/17 0912 12/13/17 1035   Stair Training PT LTG   Stair Training Goal PT LTG, Date Established 12/11/17 --  --    Stair Training Goal PT LTG, Time to Achieve 3 days --  --    Stair Training Goal PT LTG, Number of Steps 1 --  --    Stair Training Goal PT LTG, [place] Level contact guard assist --  --    Stair Training Goal PT LTG, Assist Device walker, rolling;other (see comments)  (backwards) --  --    Stair Training Goal PT LTG, Date Goal Reviewed --  12/12/17 --    Stair Training Goal PT LTG, Outcome --  --  goal met

## 2017-12-13 NOTE — PLAN OF CARE
Problem: Patient Care Overview (Adult)  Goal: Plan of Care Review  Outcome: Ongoing (interventions implemented as appropriate)    Problem: Fall Risk (Adult)  Goal: Identify Related Risk Factors and Signs and Symptoms  Outcome: Ongoing (interventions implemented as appropriate)    12/13/17 0256   Fall Risk   Fall Risk: Related Risk Factors age-related changes;history of falls

## 2017-12-13 NOTE — DISCHARGE SUMMARY
Patient Name: Dena Guillermo  MRN: [id number]  : 1955  DOS: 2017    Attending: No att. providers found    Primary Care Provider: Elisa Landry MD    Date of Admission:.2017  7:05 AM    Date of Discharge:  2017    Discharge Diagnosis: Principal Problem:    Status post total replacement of left hip  Active Problems:    Arthritis of left hip    CHAYITO on CPAP    Hypothyroid    Anxiety and depression      Hospital Course  Patient is a 62 y.o. female presented for left total hip arthroplasty by Dr. Ellis under spinal anesthesia. She tolerated surgery well and was admitted for further medical management.     Her hip has been painful for about 8 months. She denies use of assistive device for ambulation.      She had right hip replacement in Dec 2016 and recovered well.       The patient has done well postop. The patient has been able to ambulate 242 feet with PT.  The patient has had good pain control with PO pain medications.     The patient was placed on DVT prophylaxis including aspirin. The patient was encouraged to use IS for atelectasis prophylaxis.     The patient was placed on a bowel regimen to prevent constipation while on pain medication.   The patient's H/H was monitored with a slight decrease that remained asymptomatic.    It is felt by all involved that the patient can discharge home at this time, and the patient has no further questions      Procedures Performed  2017     Pre-op Diagnosis:   Left hip OA      Post-Op Diagnosis Codes:  Left hip OA     Procedure/CPT® Codes:  84223     Procedure(s):  TOTAL HIP ARTHROPLASTY ANTERIOR LEFT     Surgeon(s):  Everett Ellis MD       Pertinent Test Results:    I reviewed the patient's new clinical results.     Results from last 7 days  Lab Units 17  0558   WBC 10*3/mm3 7.98   HEMOGLOBIN g/dL 10.6*   HEMATOCRIT % 33.3*   PLATELETS 10*3/mm3 241       Results from last 7 days  Lab Units 17  0558   SODIUM mmol/L 139  "  POTASSIUM mmol/L 3.8   CHLORIDE mmol/L 107   CO2 mmol/L 26.0   BUN mg/dL 16   CREATININE mg/dL 0.90   CALCIUM mg/dL 8.0*   GLUCOSE mg/dL 114*     I reviewed the patient's new imaging including images and reports.      Physical therapy: Pt ambulates 242 ft in [place] with RWx and CGA with progression to BSC. Pt meets stair, t/f, and bed mobility goal. Pt expected to d/c home this date and reports confidence in mobility.    Discharge Assessment:    Vital Signs  /62 (BP Location: Left arm, Patient Position: Lying)  Pulse 87  Temp 99.1 °F (37.3 °C) (Oral)   Resp 17  Ht 170.2 cm (67\")  Wt 106 kg (233 lb 0.4 oz)  SpO2 95%  BMI 36.5 kg/m2  No data recorded.      General Appearance:    Alert, cooperative, in no acute distress   Lungs:     Clear to auscultation,respirations regular, even and                   unlabored    Heart:    Regular rhythm and normal rate, normal S1 and S2   Abdomen:     Normal bowel sounds, no masses, no organomegaly, soft        non-tender, non-distended, no guarding, no rebound                 tenderness   Extremities:   Moves all extremities well, no edema, no cyanosis, no              Redness. Left hip Prineo CDI   Pulses:   Pulses palpable and equal bilaterally   Skin:   No bleeding, bruising or rash   Neurologic:   Cranial nerves 2 - 12 grossly intact, sensation intact. Flexion and dorsiflexion intact bilateral feet.       Discharge Disposition: Home    Discharge Medications   Dena Guillermo   Home Medication Instructions ERNA:[id number]    Printed on:12/15/17 1827   Medication Information                      albuterol (PROVENTIL HFA;VENTOLIN HFA) 108 (90 Base) MCG/ACT inhaler  Inhale 2 puffs Every 4 (Four) Hours As Needed for Wheezing or Shortness of Air             ALPRAZolam (XANAX) 0.5 MG tablet  Take 0.5 mg by mouth Every Night.             aspirin 325 MG EC tablet  Take 1 tablet by mouth Daily             aspirin 81 MG EC tablet  Take 1 tablet by mouth Daily. Resume in 1 " month             cholecalciferol (VITAMIN D3) 1000 units tablet  Take 1,000 Units by mouth Daily             CINNAMON PO  Take 1,000 mg by mouth Daily             docusate sodium 100 MG capsule  Take 1 capsule by mouth 2 (Two) Times a Day.             escitalopram (LEXAPRO) 20 MG tablet  Take 20 mg by mouth Daily             hydrOXYzine (VISTARIL) 25 MG capsule  Take 25-75 mg by mouth Daily As Needed for Anxiety             ibuprofen (ADVIL,MOTRIN) 800 MG tablet  Take 1 tablet by mouth Every 8 (Eight) Hours As Needed for Mild Pain . Must take an hour after aspirin if needed.             levocetirizine (XYZAL) 5 MG tablet  Take 5 mg by mouth Every Evening             levothyroxine (SYNTHROID, LEVOTHROID) 100 MCG tablet  Take 100 mcg by mouth Daily             MAGNESIUM PO  Take 1 tablet by mouth Daily             Melatonin 10 MG tablet  Take 20 mg by mouth Every Night             Multiple Vitamins-Minerals (MULTIVITAMIN ADULT PO)  Take 1 tablet by mouth Daily             Omega-3 Fatty Acids (FISH OIL) 1000 MG capsule capsule  Take 1,000 mg by mouth Daily With Breakfast             oxyCODONE-acetaminophen (PERCOCET) 5-325 MG per tablet  Take 1 tablet by mouth Every 4 (Four) Hours As Needed for Moderate Pain  for up to 8 days.             Probiotic Product (PROBIOTIC ADVANCED PO)  Take 1 capsule by mouth Daily             traZODone (DESYREL) 100 MG tablet  Take 300 mg by mouth Every Night             vitamin B-12 (CYANOCOBALAMIN) 1000 MCG tablet  Take 1,000 mcg by mouth Daily             VITAMIN E PO  Take 1 capsule by mouth Daily                 Discharge Diet: regular diet    Activity at Discharge: WBAT LLE    Follow-up Appointments  Dr. Ellis per his orders    Discharge took over 30 min.  Discussed with pt discharge instruction, and answered all questions. wyMarianna Singh MD  12/13/17  11:34 AM

## 2022-05-27 ENCOUNTER — TRANSCRIBE ORDERS (OUTPATIENT)
Dept: CARDIOLOGY | Facility: CLINIC | Age: 67
End: 2022-05-27

## 2022-05-27 DIAGNOSIS — R07.2 PRECORDIAL PAIN: Primary | ICD-10-CM

## 2022-05-31 ENCOUNTER — TELEPHONE (OUTPATIENT)
Dept: CARDIOLOGY | Facility: CLINIC | Age: 67
End: 2022-05-31

## 2022-05-31 NOTE — TELEPHONE ENCOUNTER
Left patient message to call back. She needs to be scheduled for a nuc stress ordered by Dr. Porter

## 2022-06-09 NOTE — ANESTHESIA PREPROCEDURE EVALUATION
Anesthesia Evaluation     Patient summary reviewed and Nursing notes reviewed   no history of anesthetic complications:  NPO Solid Status: > 8 hours  NPO Liquid Status: > 8 hours     Airway   Mallampati: II  TM distance: >3 FB  Neck ROM: full  no difficulty expected  Dental      Pulmonary - normal exam   (+) sleep apnea,   Cardiovascular - negative cardio ROS and normal exam        Neuro/Psych  (+) psychiatric history,    GI/Hepatic/Renal/Endo      Musculoskeletal     Abdominal    Substance History      OB/GYN          Other   (+) arthritis                                     Anesthesia Plan    ASA 2     spinal     intravenous induction   Anesthetic plan and risks discussed with patient.    Plan discussed with CRNA.       negative Soft, non-tender, no hepatosplenomegaly, normal bowel sounds

## 2022-07-11 ENCOUNTER — TRANSCRIBE ORDERS (OUTPATIENT)
Dept: MAMMOGRAPHY | Facility: CLINIC | Age: 67
End: 2022-07-11

## 2022-07-11 DIAGNOSIS — R22.1 NECK MASS: ICD-10-CM

## 2022-07-11 DIAGNOSIS — R07.9 CHEST PAIN, UNSPECIFIED TYPE: Primary | ICD-10-CM

## (undated) DEVICE — NDL SPINE 18G 31/2IN PNK

## (undated) DEVICE — ADAPT ST INFUS ADMIN SYR 70IN

## (undated) DEVICE — TRY EPID SFTY 18G 3.5IN 1T7680

## (undated) DEVICE — INTENDED USE FOR SURGICAL MARKING ON INTACT SKIN, ALSO PROVIDES A PERMANENT METHOD OF IDENTIFYING OBJECTS IN THE OPERATING ROOM: Brand: WRITESITE® REGULAR TIP SKIN MARKER

## (undated) DEVICE — BNDG ELAS CO-FLEX SLF ADHR 4IN5YD LF STRL

## (undated) DEVICE — COVER,LIGHT HANDLE,FLX,1/PK: Brand: MEDLINE INDUSTRIES, INC.

## (undated) DEVICE — SYR LUERLOK 50ML

## (undated) DEVICE — SOL POVIDONE IODINE 10PCT 3/4OZ STRL

## (undated) DEVICE — SYS SKIN CLS DERMABOND PRINEO W/22CM MESH TP

## (undated) DEVICE — 1 ML TUBERCULIN SYRINGE REGULAR TIP: Brand: MONOJECT

## (undated) DEVICE — GLV SURG SIGNATURE TOUCH PF LTX 8 STRL BX/50

## (undated) DEVICE — ANTIBACTERIAL UNDYED BRAIDED (POLYGLACTIN 910), SYNTHETIC ABSORBABLE SUTURE: Brand: COATED VICRYL

## (undated) DEVICE — CANN NASL CO2 DIVIDED A/

## (undated) DEVICE — PK MAJ TOTL HIP ANT 10

## (undated) DEVICE — ENCORE® LATEX MICRO SIZE 8, STERILE LATEX POWDER-FREE SURGICAL GLOVE: Brand: ENCORE